# Patient Record
Sex: FEMALE | Race: WHITE | Employment: FULL TIME | URBAN - METROPOLITAN AREA
[De-identification: names, ages, dates, MRNs, and addresses within clinical notes are randomized per-mention and may not be internally consistent; named-entity substitution may affect disease eponyms.]

---

## 2017-05-04 ENCOUNTER — EMPLOYEE WELLNESS (OUTPATIENT)
Dept: OTHER | Age: 47
End: 2017-05-04

## 2017-05-04 LAB
CHOLESTEROL, TOTAL: 213 MG/DL (ref 0–199)
GLUCOSE BLD-MCNC: 78 MG/DL (ref 74–107)
HDLC SERPL-MCNC: 57 MG/DL
LDL CHOLESTEROL CALCULATED: 140 MG/DL (ref 0–99)
TRIGL SERPL-MCNC: 81 MG/DL (ref 0–149)

## 2018-03-20 VITALS — WEIGHT: 173 LBS | BODY MASS INDEX: 28.79 KG/M2

## 2018-04-21 ENCOUNTER — HOSPITAL ENCOUNTER (OUTPATIENT)
Age: 48
Discharge: HOME | End: 2018-04-21
Payer: COMMERCIAL

## 2018-04-21 LAB — FOLLICLE STIMULATING HORMONE: 29.3 MIU/ML

## 2018-04-21 PROCEDURE — 83001 ASSAY OF GONADOTROPIN (FSH): CPT

## 2018-04-21 PROCEDURE — 36415 COLL VENOUS BLD VENIPUNCTURE: CPT

## 2018-05-01 ENCOUNTER — EMPLOYEE WELLNESS (OUTPATIENT)
Dept: OTHER | Age: 48
End: 2018-05-01

## 2018-05-01 LAB
CHOLESTEROL, TOTAL: 244 MG/DL (ref 0–199)
GLUCOSE BLD-MCNC: 97 MG/DL (ref 74–107)
HDLC SERPL-MCNC: 54 MG/DL
LDL CHOLESTEROL CALCULATED: 163 MG/DL (ref 0–99)
TRIGL SERPL-MCNC: 136 MG/DL (ref 0–149)

## 2018-05-07 VITALS — BODY MASS INDEX: 30.62 KG/M2 | WEIGHT: 184 LBS

## 2018-05-29 ENCOUNTER — HOSPITAL ENCOUNTER (OUTPATIENT)
Dept: ULTRASOUND IMAGING | Age: 48
Discharge: HOME | End: 2018-05-31
Payer: COMMERCIAL

## 2018-05-29 DIAGNOSIS — N95.0 POST-MENOPAUSAL BLEEDING: ICD-10-CM

## 2018-05-29 PROCEDURE — 76856 US EXAM PELVIC COMPLETE: CPT

## 2018-05-29 PROCEDURE — 76830 TRANSVAGINAL US NON-OB: CPT

## 2018-08-07 ENCOUNTER — NURSE TRIAGE (OUTPATIENT)
Dept: OTHER | Facility: CLINIC | Age: 48
End: 2018-08-07

## 2019-02-08 ENCOUNTER — TELEPHONE (OUTPATIENT)
Dept: ORTHOPEDIC SURGERY | Age: 49
End: 2019-02-08

## 2019-02-08 DIAGNOSIS — M79.641 RIGHT HAND PAIN: Primary | ICD-10-CM

## 2019-02-11 ENCOUNTER — OFFICE VISIT (OUTPATIENT)
Dept: ORTHOPEDIC SURGERY | Age: 49
End: 2019-02-11
Payer: COMMERCIAL

## 2019-02-11 VITALS
TEMPERATURE: 98.1 F | DIASTOLIC BLOOD PRESSURE: 89 MMHG | HEART RATE: 79 BPM | SYSTOLIC BLOOD PRESSURE: 139 MMHG | RESPIRATION RATE: 16 BRPM

## 2019-02-11 DIAGNOSIS — M65.311 TRIGGER FINGER OF RIGHT THUMB: Primary | ICD-10-CM

## 2019-02-11 PROCEDURE — 20550 NJX 1 TENDON SHEATH/LIGAMENT: CPT | Performed by: ORTHOPAEDIC SURGERY

## 2019-02-11 PROCEDURE — 99203 OFFICE O/P NEW LOW 30 MIN: CPT | Performed by: ORTHOPAEDIC SURGERY

## 2019-02-11 RX ORDER — BETAMETHASONE SODIUM PHOSPHATE AND BETAMETHASONE ACETATE 3; 3 MG/ML; MG/ML
6 INJECTION, SUSPENSION INTRA-ARTICULAR; INTRALESIONAL; INTRAMUSCULAR; SOFT TISSUE ONCE
Status: COMPLETED | OUTPATIENT
Start: 2019-02-11 | End: 2019-02-11

## 2019-02-11 RX ADMIN — BETAMETHASONE SODIUM PHOSPHATE AND BETAMETHASONE ACETATE 6 MG: 3; 3 INJECTION, SUSPENSION INTRA-ARTICULAR; INTRALESIONAL; INTRAMUSCULAR; SOFT TISSUE at 12:28

## 2019-05-25 ENCOUNTER — EMPLOYEE WELLNESS (OUTPATIENT)
Dept: OTHER | Age: 49
End: 2019-05-25

## 2019-05-25 LAB
CHOLESTEROL, TOTAL: 281 MG/DL (ref 0–199)
GLUCOSE BLD-MCNC: 89 MG/DL (ref 74–107)
HDLC SERPL-MCNC: 47 MG/DL
LDL CHOLESTEROL CALCULATED: 203 MG/DL (ref 0–99)
TRIGL SERPL-MCNC: 157 MG/DL (ref 0–149)

## 2019-06-03 ENCOUNTER — HOSPITAL ENCOUNTER (OUTPATIENT)
Dept: ULTRASOUND IMAGING | Age: 49
Discharge: HOME | End: 2019-06-05
Payer: COMMERCIAL

## 2019-06-03 VITALS — BODY MASS INDEX: 30.79 KG/M2 | WEIGHT: 185 LBS

## 2019-06-03 DIAGNOSIS — D21.9 FIBROID: ICD-10-CM

## 2019-06-03 PROCEDURE — 76830 TRANSVAGINAL US NON-OB: CPT

## 2019-06-03 PROCEDURE — 76856 US EXAM PELVIC COMPLETE: CPT

## 2019-06-26 ENCOUNTER — HOSPITAL ENCOUNTER (OUTPATIENT)
Age: 49
Discharge: HOME | End: 2019-06-28
Payer: COMMERCIAL

## 2019-06-26 PROCEDURE — 88305 TISSUE EXAM BY PATHOLOGIST: CPT

## 2019-07-09 ENCOUNTER — HOSPITAL ENCOUNTER (OUTPATIENT)
Dept: GENERAL RADIOLOGY | Age: 49
Discharge: HOME | End: 2019-07-11
Payer: COMMERCIAL

## 2019-07-09 DIAGNOSIS — Z12.31 VISIT FOR SCREENING MAMMOGRAM: ICD-10-CM

## 2019-07-09 PROCEDURE — 77063 BREAST TOMOSYNTHESIS BI: CPT

## 2019-07-17 ENCOUNTER — OFFICE VISIT (OUTPATIENT)
Dept: PODIATRY | Age: 49
End: 2019-07-17
Payer: COMMERCIAL

## 2019-07-17 VITALS
SYSTOLIC BLOOD PRESSURE: 122 MMHG | TEMPERATURE: 97.4 F | WEIGHT: 178 LBS | BODY MASS INDEX: 29.62 KG/M2 | DIASTOLIC BLOOD PRESSURE: 82 MMHG

## 2019-07-17 DIAGNOSIS — M79.671 PAIN IN RIGHT FOOT: ICD-10-CM

## 2019-07-17 DIAGNOSIS — M72.2 PLANTAR FASCIITIS: Primary | ICD-10-CM

## 2019-07-17 DIAGNOSIS — M77.31 CALCANEAL SPUR OF FOOT, RIGHT: ICD-10-CM

## 2019-07-17 PROCEDURE — 29540 STRAPPING ANKLE &/FOOT: CPT | Performed by: PODIATRIST

## 2019-07-17 PROCEDURE — 20550 NJX 1 TENDON SHEATH/LIGAMENT: CPT | Performed by: PODIATRIST

## 2019-07-17 PROCEDURE — 99203 OFFICE O/P NEW LOW 30 MIN: CPT | Performed by: PODIATRIST

## 2019-07-17 RX ORDER — ETODOLAC 400 MG/1
400 TABLET, FILM COATED ORAL 2 TIMES DAILY
Qty: 180 TABLET | Refills: 0 | Status: SHIPPED
Start: 2019-07-17 | End: 2020-06-08 | Stop reason: ALTCHOICE

## 2019-07-17 RX ORDER — BETAMETHASONE SODIUM PHOSPHATE AND BETAMETHASONE ACETATE 3; 3 MG/ML; MG/ML
6 INJECTION, SUSPENSION INTRA-ARTICULAR; INTRALESIONAL; INTRAMUSCULAR; SOFT TISSUE ONCE
Status: COMPLETED | OUTPATIENT
Start: 2019-07-17 | End: 2019-07-17

## 2019-07-17 RX ORDER — BUPIVACAINE HYDROCHLORIDE 5 MG/ML
1 INJECTION, SOLUTION PERINEURAL ONCE
Status: COMPLETED | OUTPATIENT
Start: 2019-07-17 | End: 2019-07-17

## 2019-07-17 RX ORDER — CLONIDINE HYDROCHLORIDE 0.1 MG/1
0.1 TABLET ORAL DAILY
COMMUNITY
End: 2020-06-08 | Stop reason: ALTCHOICE

## 2019-07-17 RX ADMIN — BETAMETHASONE SODIUM PHOSPHATE AND BETAMETHASONE ACETATE 6 MG: 3; 3 INJECTION, SUSPENSION INTRA-ARTICULAR; INTRALESIONAL; INTRAMUSCULAR; SOFT TISSUE at 17:01

## 2019-07-17 RX ADMIN — BUPIVACAINE HYDROCHLORIDE 5 MG: 5 INJECTION, SOLUTION PERINEURAL at 17:01

## 2019-07-17 NOTE — PROGRESS NOTES
tobacco: Never Used    Tobacco comment: Pt states she smokes occasionally   Substance and Sexual Activity    Alcohol use: No     Alcohol/week: 0.0 standard drinks    Drug use: No    Sexual activity: Not on file   Lifestyle    Physical activity:     Days per week: Not on file     Minutes per session: Not on file    Stress: Not on file   Relationships    Social connections:     Talks on phone: Not on file     Gets together: Not on file     Attends Baptism service: Not on file     Active member of club or organization: Not on file     Attends meetings of clubs or organizations: Not on file     Relationship status: Not on file    Intimate partner violence:     Fear of current or ex partner: Not on file     Emotionally abused: Not on file     Physically abused: Not on file     Forced sexual activity: Not on file   Other Topics Concern    Not on file   Social History Narrative    Not on file       Vitals:    07/17/19 1607   BP: 122/82   Temp: 97.4 °F (36.3 °C)   TempSrc: Temporal   Weight: 178 lb (80.7 kg)       Focused Lower Extremity Physical Exam:  Vitals:    07/17/19 1607   BP: 122/82   Temp: 97.4 °F (36.3 °C)        Foot Exam    General  General Appearance: appears stated age and healthy   Orientation: alert and oriented to person, place, and time       Right Foot/Ankle     Inspection and Palpation  Tenderness: calcaneus tenderness, bony tenderness and plantar fascia              Ortho Exam    Vascular: pulses  dp  pt  palpable  Capillary Refill Time:   Hair growth  Skin:    Edema:    Neurologic:      Musculoskeletal/ Orthopedic examination:  Pain right heel inferior aspect pain with palpation weightbearing inferior aspect of right heel pain at the junction of the inferior plantar fascial muscle and the calcaneus  NAIL   Web space   Derm:      Xr Foot Right (min 3 Views)    Result Date: 7/17/2019  XRAY INTERPREATION Indication: pain Examination: 3 views foot Narrative:  There is a inferior and posterior calcaneal spur  Interpretation: Heel spur inferior posterior        . Assessment and Plan:  Melissa Mackenzie was seen today for established new doctor. Diagnoses and all orders for this visit:    Plantar fasciitis  -     XR FOOT RIGHT (MIN 3 VIEWS); Future    Calcaneal spur of foot, right    Pain in right foot    Other orders  -     etodolac (LODINE) 400 MG tablet; Take 1 tablet by mouth 2 times daily    Potential risks, complications, alternative treatments, and procedure prognosis were explained to the patient. Verbal informed consent was obtained from the patient. Betadine and alcohol preparation was performed over plantar fascia. 1 mL of 0.5% Marcaine plain and 1 cc of Celestone Soluspan 6 mg injected in standard medial approach plantar fascia. Patient tolerated conical steroid injection well. Band-Aid applied. The patient was educated on a possible steroid flare and the use of ice with frozen water bottle 10 minutes each night discussed. Continue passive and active range of motion stretches and strengthening bilateral plantar fascia and Achilles tendons. No follow-ups on file. STRAPPING AND TAPING  An application of 1 inch and 2 inch tape to plantar aspect of foot comprising of felt pad to support the foot and ankle to reduce pain and control swelling. Seen By:  Emir Blackman DPM      Document was created using voice recognition software. Note was reviewed, however may contain grammatical errors.

## 2019-08-05 ENCOUNTER — OFFICE VISIT (OUTPATIENT)
Dept: ORTHOPEDIC SURGERY | Age: 49
End: 2019-08-05
Payer: COMMERCIAL

## 2019-08-05 VITALS
BODY MASS INDEX: 28.56 KG/M2 | TEMPERATURE: 98.1 F | RESPIRATION RATE: 20 BRPM | WEIGHT: 182 LBS | SYSTOLIC BLOOD PRESSURE: 144 MMHG | DIASTOLIC BLOOD PRESSURE: 94 MMHG | HEART RATE: 88 BPM | HEIGHT: 67 IN

## 2019-08-05 DIAGNOSIS — M65.311 TRIGGER FINGER OF RIGHT THUMB: Primary | ICD-10-CM

## 2019-08-05 PROCEDURE — 20550 NJX 1 TENDON SHEATH/LIGAMENT: CPT | Performed by: ORTHOPAEDIC SURGERY

## 2019-08-05 PROCEDURE — 99213 OFFICE O/P EST LOW 20 MIN: CPT | Performed by: ORTHOPAEDIC SURGERY

## 2019-08-05 RX ORDER — BETAMETHASONE SODIUM PHOSPHATE AND BETAMETHASONE ACETATE 3; 3 MG/ML; MG/ML
6 INJECTION, SUSPENSION INTRA-ARTICULAR; INTRALESIONAL; INTRAMUSCULAR; SOFT TISSUE ONCE
Status: COMPLETED | OUTPATIENT
Start: 2019-08-05 | End: 2019-08-05

## 2019-08-05 RX ADMIN — BETAMETHASONE SODIUM PHOSPHATE AND BETAMETHASONE ACETATE 6 MG: 3; 3 INJECTION, SUSPENSION INTRA-ARTICULAR; INTRALESIONAL; INTRAMUSCULAR; SOFT TISSUE at 15:31

## 2019-09-10 ENCOUNTER — HOSPITAL ENCOUNTER (OUTPATIENT)
Dept: CT IMAGING | Age: 49
Discharge: HOME | End: 2019-09-12
Payer: COMMERCIAL

## 2019-09-10 DIAGNOSIS — K11.20 SIALOADENITIS: ICD-10-CM

## 2019-09-10 PROCEDURE — 70492 CT SFT TSUE NCK W/O & W/DYE: CPT

## 2019-09-10 PROCEDURE — 2580000003 HC RX 258: Performed by: RADIOLOGY

## 2019-09-10 PROCEDURE — 6360000004 HC RX CONTRAST MEDICATION: Performed by: RADIOLOGY

## 2019-09-10 RX ORDER — SODIUM CHLORIDE 0.9 % (FLUSH) 0.9 %
10 SYRINGE (ML) INJECTION PRN
Status: COMPLETED | OUTPATIENT
Start: 2019-09-10 | End: 2019-09-10

## 2019-09-10 RX ADMIN — Medication 10 ML: at 15:43

## 2019-09-10 RX ADMIN — IOPAMIDOL 80 ML: 755 INJECTION, SOLUTION INTRAVENOUS at 15:43

## 2019-10-19 ENCOUNTER — HOSPITAL ENCOUNTER (OUTPATIENT)
Age: 49
Discharge: HOME | End: 2019-10-19
Payer: COMMERCIAL

## 2019-10-19 LAB
HCT VFR BLD CALC: 44.4 % (ref 34–48)
HEMOGLOBIN: 15.2 G/DL (ref 11.5–15.5)
MCH RBC QN AUTO: 30 PG (ref 26–35)
MCHC RBC AUTO-ENTMCNC: 34.2 % (ref 32–34.5)
MCV RBC AUTO: 87.6 FL (ref 80–99.9)
PDW BLD-RTO: 11.9 FL (ref 11.5–15)
PLATELET # BLD: 325 E9/L (ref 130–450)
PMV BLD AUTO: 11.5 FL (ref 7–12)
RBC # BLD: 5.07 E12/L (ref 3.5–5.5)
TSH SERPL DL<=0.05 MIU/L-ACNC: 2.12 UIU/ML (ref 0.27–4.2)
WBC # BLD: 9.1 E9/L (ref 4.5–11.5)

## 2019-10-19 PROCEDURE — 84443 ASSAY THYROID STIM HORMONE: CPT

## 2019-10-19 PROCEDURE — 36415 COLL VENOUS BLD VENIPUNCTURE: CPT

## 2019-10-19 PROCEDURE — 85027 COMPLETE CBC AUTOMATED: CPT

## 2020-02-21 ENCOUNTER — TELEPHONE (OUTPATIENT)
Dept: ORTHOPEDIC SURGERY | Age: 50
End: 2020-02-21

## 2020-02-24 ENCOUNTER — OFFICE VISIT (OUTPATIENT)
Dept: ORTHOPEDIC SURGERY | Age: 50
End: 2020-02-24
Payer: COMMERCIAL

## 2020-02-24 VITALS — DIASTOLIC BLOOD PRESSURE: 87 MMHG | SYSTOLIC BLOOD PRESSURE: 127 MMHG | HEART RATE: 89 BPM | RESPIRATION RATE: 18 BRPM

## 2020-02-24 PROCEDURE — 99214 OFFICE O/P EST MOD 30 MIN: CPT | Performed by: ORTHOPAEDIC SURGERY

## 2020-02-24 PROCEDURE — 20550 NJX 1 TENDON SHEATH/LIGAMENT: CPT | Performed by: ORTHOPAEDIC SURGERY

## 2020-02-24 RX ORDER — ATORVASTATIN CALCIUM 10 MG/1
10 TABLET, FILM COATED ORAL DAILY
COMMUNITY
Start: 2020-02-06 | End: 2021-08-05 | Stop reason: SDUPTHER

## 2020-02-24 RX ORDER — LOSARTAN POTASSIUM 25 MG/1
25 TABLET ORAL DAILY
COMMUNITY
Start: 2020-02-06 | End: 2020-12-11

## 2020-02-24 RX ORDER — BETAMETHASONE SODIUM PHOSPHATE AND BETAMETHASONE ACETATE 3; 3 MG/ML; MG/ML
6 INJECTION, SUSPENSION INTRA-ARTICULAR; INTRALESIONAL; INTRAMUSCULAR; SOFT TISSUE ONCE
Status: COMPLETED | OUTPATIENT
Start: 2020-02-24 | End: 2020-02-24

## 2020-02-24 RX ADMIN — BETAMETHASONE SODIUM PHOSPHATE AND BETAMETHASONE ACETATE 6 MG: 3; 3 INJECTION, SUSPENSION INTRA-ARTICULAR; INTRALESIONAL; INTRAMUSCULAR; SOFT TISSUE at 15:59

## 2020-02-24 NOTE — PROGRESS NOTES
Department of Orthopedic Surgery  [facility] Cornell See MD  History and Physical      CHIEF COMPLAINT:  Right thumb pain     HISTORY OF PRESENT ILLNESS:                The patient is a 44 y.o. female who presents with right thumb pain that has been ongoing for the past 1 year. She states that she had an injection for a trigger thumb on 2/11/2019 that she got 7 months of relief from. Her last injection she got 4 months relief. She does get numbness and tingling into her hand as well that wakes her up at night. She is right hand dominant. 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 Past Medical History:        Diagnosis Date    Hyperlipidemia     Hypertension      Past Surgical History:    No past surgical history on file. Current Medications:   No current facility-administered medications for this visit. Allergies:  Patient has no known allergies. Social History:   TOBACCO:   reports that she has been smoking cigarettes. She has been smoking about 0.25 packs per day. She has never used smokeless tobacco.  ETOH:   reports no history of alcohol use. DRUGS:   reports no history of drug use.   ACTIVITIES OF DAILY LIVING:    OCCUPATION:    Family History:       Problem Relation Age of Onset    Heart Surgery Mother     Heart Attack Mother     Diabetes Mother        REVIEW OF SYSTEMS:  CONSTITUTIONAL:  negative  EYES:  negative  HEENT:  negative  RESPIRATORY:  negative  CARDIOVASCULAR:  negative  MUSCULOSKELETAL:  positive for  Pain right thumb  NEUROLOGICAL:  positive for numbness and tingling    PHYSICAL EXAM:    VITALS:  /87 (Site: Left Upper Arm, Position: Sitting, Cuff Size: Medium Adult)   Pulse 89   Resp 18   CONSTITUTIONAL:  awake, alert, cooperative, no apparent distress, and appears stated age  EYES:  Lids and lashes normal, pupils equal, round and reactive to light, extra ocular muscles intact, sclera clear, conjunctiva normal  ENT:  normocepalic, without obvious abnormality  NECK:  supple, symmetrical, trachea midline  LUNGS:  no increased work of breathing  CARDIOVASCULAR:  no edema  NEUROLOGIC:  Awake, alert, oriented to name, place and time. Cranial nerves II-XII are grossly intact. Motor is 5 out of 5 bilaterally. gait is normal.  MUSCULOSKELETAL:  Right hand: - cmc grind. Mildly positive Finkelstein, + tenderness over A1 pulley of the thumb. + Brit, + Tinel at the wrist, - tinel at the elbow. SILT median, ulnar, radial nerve distribution. No pain over any other A1 pulley. +2/4 radial pulse. Full digital ROM. Full wrist ROM. DATA:    CBC:   Lab Results   Component Value Date    WBC 9.1 10/19/2019    RBC 5.07 10/19/2019    HGB 15.2 10/19/2019    HCT 44.4 10/19/2019    MCV 87.6 10/19/2019    MCH 30.0 10/19/2019    MCHC 34.2 10/19/2019    RDW 11.9 10/19/2019     10/19/2019    MPV 11.5 10/19/2019     PT/INR:  No results found for: PROTIME, INR    Radiology Review:  Xray right 3 views  hand obtained and reviewed in office today demonstrating stage I Cmc arthritis no fractures or dislocations. Impression: No fractures or dislocations. IMPRESSION:  · Right carpal tunnel syndrome  · Right trigger thumb  · Right thumb ganglion of flexor sheath    PLAN:  Patient would like to proceed with trigger thumb release and endoscopic carpal tunnel release. She wants to have surgery in may and would like to have an injection for the trigger thumb today to hold her over. Procedure Note Trigger Finger Injection    The right Thumb over the A1 pulley was identified as the injection site. The risk and benefits of a cortisone injection were explained and the patient consented to the injection. Under sterile conditions, the digit was injected with a mixture of 1 mL of 1% Lidocaine and 1 mL of 6 mg/mL Betamethasone without complication. A sterile bandage was applied. I have seen and evaluated the patient and agree with the above assessment and plan on today's visit.  I have performed the rahman components of the history and physical examination with significant findings of current right thumb triggering with ganglion of flexor sheath as well as carpal tunnel syndrome. Injection repeated over the thumb A1 pulley. Patient was also scheduled for right thumb trigger release with ganglion cyst excision and right endoscopic carpal tunnel release. I concur with the findings and plan as documented. I explained the risks, benefits, alternatives and complications of surgery with the patient including but not limited to the risks of infection, possible damage to nerves, vessels, or tendons, stiffness, loss of range of motion, scar sensitivity, wound healing complications, worsening symptoms, possible need for therapy, as well as the possible need further surgery and unanticipated complications. The patient voiced understanding and all questions were answered. The patient elected to proceed with surgical intervention.      Char Grimes  2/24/2020

## 2020-06-04 ENCOUNTER — PREP FOR PROCEDURE (OUTPATIENT)
Dept: ORTHOPEDIC SURGERY | Age: 50
End: 2020-06-04

## 2020-06-04 RX ORDER — SODIUM CHLORIDE 0.9 % (FLUSH) 0.9 %
10 SYRINGE (ML) INJECTION PRN
Status: CANCELLED | OUTPATIENT
Start: 2020-06-04

## 2020-06-04 RX ORDER — SODIUM CHLORIDE 0.9 % (FLUSH) 0.9 %
10 SYRINGE (ML) INJECTION EVERY 12 HOURS SCHEDULED
Status: CANCELLED | OUTPATIENT
Start: 2020-06-04

## 2020-06-04 RX ORDER — SODIUM CHLORIDE 9 MG/ML
INJECTION, SOLUTION INTRAVENOUS CONTINUOUS
Status: CANCELLED | OUTPATIENT
Start: 2020-06-04

## 2020-06-05 ENCOUNTER — HOSPITAL ENCOUNTER (OUTPATIENT)
Age: 50
Discharge: HOME | End: 2020-06-07
Payer: COMMERCIAL

## 2020-06-05 PROCEDURE — U0003 INFECTIOUS AGENT DETECTION BY NUCLEIC ACID (DNA OR RNA); SEVERE ACUTE RESPIRATORY SYNDROME CORONAVIRUS 2 (SARS-COV-2) (CORONAVIRUS DISEASE [COVID-19]), AMPLIFIED PROBE TECHNIQUE, MAKING USE OF HIGH THROUGHPUT TECHNOLOGIES AS DESCRIBED BY CMS-2020-01-R: HCPCS

## 2020-06-07 LAB
SARS-COV-2: NOT DETECTED
SOURCE: NORMAL

## 2020-06-08 RX ORDER — IBUPROFEN 400 MG/1
400 TABLET ORAL EVERY 6 HOURS PRN
COMMUNITY
End: 2021-05-05 | Stop reason: CLARIF

## 2020-06-08 RX ORDER — MULTIVIT-MIN/IRON/FOLIC ACID/K 18-600-40
CAPSULE ORAL NIGHTLY
COMMUNITY
End: 2021-06-08

## 2020-06-10 ENCOUNTER — HOSPITAL ENCOUNTER (OUTPATIENT)
Age: 50
Setting detail: OUTPATIENT SURGERY
Discharge: HOME | End: 2020-06-10
Attending: ORTHOPAEDIC SURGERY | Admitting: ORTHOPAEDIC SURGERY
Payer: COMMERCIAL

## 2020-06-10 ENCOUNTER — ANESTHESIA EVENT (OUTPATIENT)
Dept: OPERATING ROOM | Age: 50
End: 2020-06-10
Payer: COMMERCIAL

## 2020-06-10 ENCOUNTER — ANESTHESIA (OUTPATIENT)
Dept: OPERATING ROOM | Age: 50
End: 2020-06-10
Payer: COMMERCIAL

## 2020-06-10 VITALS
TEMPERATURE: 97.1 F | HEIGHT: 66 IN | BODY MASS INDEX: 29.57 KG/M2 | OXYGEN SATURATION: 97 % | HEART RATE: 70 BPM | WEIGHT: 184 LBS | DIASTOLIC BLOOD PRESSURE: 80 MMHG | RESPIRATION RATE: 18 BRPM | SYSTOLIC BLOOD PRESSURE: 115 MMHG

## 2020-06-10 VITALS — SYSTOLIC BLOOD PRESSURE: 96 MMHG | OXYGEN SATURATION: 97 % | DIASTOLIC BLOOD PRESSURE: 57 MMHG

## 2020-06-10 PROCEDURE — 6360000002 HC RX W HCPCS: Performed by: PHYSICIAN ASSISTANT

## 2020-06-10 PROCEDURE — 2500000003 HC RX 250 WO HCPCS: Performed by: ORTHOPAEDIC SURGERY

## 2020-06-10 PROCEDURE — 7100000010 HC PHASE II RECOVERY - FIRST 15 MIN: Performed by: ORTHOPAEDIC SURGERY

## 2020-06-10 PROCEDURE — 2720000010 HC SURG SUPPLY STERILE: Performed by: ORTHOPAEDIC SURGERY

## 2020-06-10 PROCEDURE — 3600000013 HC SURGERY LEVEL 3 ADDTL 15MIN: Performed by: ORTHOPAEDIC SURGERY

## 2020-06-10 PROCEDURE — 3700000001 HC ADD 15 MINUTES (ANESTHESIA): Performed by: ORTHOPAEDIC SURGERY

## 2020-06-10 PROCEDURE — 2709999900 HC NON-CHARGEABLE SUPPLY: Performed by: ORTHOPAEDIC SURGERY

## 2020-06-10 PROCEDURE — 26055 INCISE FINGER TENDON SHEATH: CPT | Performed by: ORTHOPAEDIC SURGERY

## 2020-06-10 PROCEDURE — 2580000003 HC RX 258: Performed by: PHYSICIAN ASSISTANT

## 2020-06-10 PROCEDURE — 7100000011 HC PHASE II RECOVERY - ADDTL 15 MIN: Performed by: ORTHOPAEDIC SURGERY

## 2020-06-10 PROCEDURE — 6360000002 HC RX W HCPCS: Performed by: NURSE ANESTHETIST, CERTIFIED REGISTERED

## 2020-06-10 PROCEDURE — 3700000000 HC ANESTHESIA ATTENDED CARE: Performed by: ORTHOPAEDIC SURGERY

## 2020-06-10 PROCEDURE — 3600000003 HC SURGERY LEVEL 3 BASE: Performed by: ORTHOPAEDIC SURGERY

## 2020-06-10 PROCEDURE — 29848 WRIST ENDOSCOPY/SURGERY: CPT | Performed by: ORTHOPAEDIC SURGERY

## 2020-06-10 RX ORDER — FENTANYL CITRATE 50 UG/ML
INJECTION, SOLUTION INTRAMUSCULAR; INTRAVENOUS PRN
Status: DISCONTINUED | OUTPATIENT
Start: 2020-06-10 | End: 2020-06-10 | Stop reason: SDUPTHER

## 2020-06-10 RX ORDER — HYDROCODONE BITARTRATE AND ACETAMINOPHEN 5; 325 MG/1; MG/1
1 TABLET ORAL
Status: DISCONTINUED | OUTPATIENT
Start: 2020-06-10 | End: 2020-06-10 | Stop reason: HOSPADM

## 2020-06-10 RX ORDER — DIPHENHYDRAMINE HYDROCHLORIDE 50 MG/ML
12.5 INJECTION INTRAMUSCULAR; INTRAVENOUS
Status: DISCONTINUED | OUTPATIENT
Start: 2020-06-10 | End: 2020-06-10 | Stop reason: HOSPADM

## 2020-06-10 RX ORDER — MEPERIDINE HYDROCHLORIDE 25 MG/ML
12.5 INJECTION INTRAMUSCULAR; INTRAVENOUS; SUBCUTANEOUS EVERY 10 MIN PRN
Status: DISCONTINUED | OUTPATIENT
Start: 2020-06-10 | End: 2020-06-10 | Stop reason: HOSPADM

## 2020-06-10 RX ORDER — SODIUM CHLORIDE 0.9 % (FLUSH) 0.9 %
10 SYRINGE (ML) INJECTION EVERY 12 HOURS SCHEDULED
Status: DISCONTINUED | OUTPATIENT
Start: 2020-06-10 | End: 2020-06-10 | Stop reason: HOSPADM

## 2020-06-10 RX ORDER — SODIUM CHLORIDE 0.9 % (FLUSH) 0.9 %
10 SYRINGE (ML) INJECTION PRN
Status: DISCONTINUED | OUTPATIENT
Start: 2020-06-10 | End: 2020-06-10 | Stop reason: HOSPADM

## 2020-06-10 RX ORDER — PROMETHAZINE HYDROCHLORIDE 25 MG/ML
6.25 INJECTION, SOLUTION INTRAMUSCULAR; INTRAVENOUS
Status: DISCONTINUED | OUTPATIENT
Start: 2020-06-10 | End: 2020-06-10 | Stop reason: HOSPADM

## 2020-06-10 RX ORDER — FENTANYL CITRATE 50 UG/ML
50 INJECTION, SOLUTION INTRAMUSCULAR; INTRAVENOUS EVERY 5 MIN PRN
Status: DISCONTINUED | OUTPATIENT
Start: 2020-06-10 | End: 2020-06-10 | Stop reason: HOSPADM

## 2020-06-10 RX ORDER — SODIUM CHLORIDE 9 MG/ML
INJECTION, SOLUTION INTRAVENOUS CONTINUOUS
Status: DISCONTINUED | OUTPATIENT
Start: 2020-06-10 | End: 2020-06-10 | Stop reason: HOSPADM

## 2020-06-10 RX ORDER — MIDAZOLAM HYDROCHLORIDE 1 MG/ML
INJECTION INTRAMUSCULAR; INTRAVENOUS PRN
Status: DISCONTINUED | OUTPATIENT
Start: 2020-06-10 | End: 2020-06-10 | Stop reason: SDUPTHER

## 2020-06-10 RX ORDER — CEFAZOLIN SODIUM 2 G/50ML
2 SOLUTION INTRAVENOUS
Status: DISCONTINUED | OUTPATIENT
Start: 2020-06-10 | End: 2020-06-10 | Stop reason: CLARIF

## 2020-06-10 RX ORDER — HYDROCODONE BITARTRATE AND ACETAMINOPHEN 5; 325 MG/1; MG/1
1 TABLET ORAL EVERY 6 HOURS PRN
Qty: 12 TABLET | Refills: 0 | Status: SHIPPED | OUTPATIENT
Start: 2020-06-10 | End: 2020-06-17

## 2020-06-10 RX ORDER — PROPOFOL 10 MG/ML
INJECTION, EMULSION INTRAVENOUS CONTINUOUS PRN
Status: DISCONTINUED | OUTPATIENT
Start: 2020-06-10 | End: 2020-06-10 | Stop reason: SDUPTHER

## 2020-06-10 RX ORDER — LIDOCAINE HYDROCHLORIDE AND EPINEPHRINE 10; 10 MG/ML; UG/ML
INJECTION, SOLUTION INFILTRATION; PERINEURAL PRN
Status: DISCONTINUED | OUTPATIENT
Start: 2020-06-10 | End: 2020-06-10 | Stop reason: ALTCHOICE

## 2020-06-10 RX ADMIN — SODIUM CHLORIDE: 9 INJECTION, SOLUTION INTRAVENOUS at 09:06

## 2020-06-10 RX ADMIN — FENTANYL CITRATE 50 MCG: 50 INJECTION, SOLUTION INTRAMUSCULAR; INTRAVENOUS at 09:16

## 2020-06-10 RX ADMIN — Medication 2 G: at 09:06

## 2020-06-10 RX ADMIN — PROPOFOL 125 MCG/KG/MIN: 10 INJECTION, EMULSION INTRAVENOUS at 09:16

## 2020-06-10 RX ADMIN — FENTANYL CITRATE 50 MCG: 50 INJECTION, SOLUTION INTRAMUSCULAR; INTRAVENOUS at 09:20

## 2020-06-10 RX ADMIN — MIDAZOLAM 2 MG: 1 INJECTION INTRAMUSCULAR; INTRAVENOUS at 09:09

## 2020-06-10 ASSESSMENT — PAIN SCALES - GENERAL
PAINLEVEL_OUTOF10: 0

## 2020-06-10 ASSESSMENT — PULMONARY FUNCTION TESTS
PIF_VALUE: 0
PIF_VALUE: 1
PIF_VALUE: 0
PIF_VALUE: 0
PIF_VALUE: 1
PIF_VALUE: 1
PIF_VALUE: 0
PIF_VALUE: 1
PIF_VALUE: 0
PIF_VALUE: 1

## 2020-06-10 ASSESSMENT — PAIN DESCRIPTION - LOCATION: LOCATION: HAND

## 2020-06-10 ASSESSMENT — LIFESTYLE VARIABLES: SMOKING_STATUS: 1

## 2020-06-10 ASSESSMENT — PAIN DESCRIPTION - PAIN TYPE
TYPE: SURGICAL PAIN

## 2020-06-10 ASSESSMENT — PAIN DESCRIPTION - ORIENTATION: ORIENTATION: RIGHT

## 2020-06-10 ASSESSMENT — PAIN - FUNCTIONAL ASSESSMENT: PAIN_FUNCTIONAL_ASSESSMENT: 0-10

## 2020-06-10 NOTE — H&P
extra ocular muscles intact, sclera clear, conjunctiva normal  ENT:  normocepalic, without obvious abnormality  NECK:  supple, symmetrical, trachea midline  LUNGS:  no increased work of breathing  CARDIOVASCULAR:  no edema  NEUROLOGIC:  Awake, alert, oriented to name, place and time. Cranial nerves II-XII are grossly intact. Motor is 5 out of 5 bilaterally. gait is normal.  MUSCULOSKELETAL:  Right hand: - cmc grind. Mildly positive Finkelstein, + tenderness over A1 pulley of the thumb. + Brit, + Tinel at the wrist, - tinel at the elbow. SILT median, ulnar, radial nerve distribution. No pain over any other A1 pulley. +2/4 radial pulse. Full digital ROM. Full wrist ROM.    DATA:    CBC:         Lab Results   Component Value Date     WBC 9.1 10/19/2019     RBC 5.07 10/19/2019     HGB 15.2 10/19/2019     HCT 44.4 10/19/2019     MCV 87.6 10/19/2019     MCH 30.0 10/19/2019     MCHC 34.2 10/19/2019     RDW 11.9 10/19/2019      10/19/2019     MPV 11.5 10/19/2019      PT/INR:  No results found for: PROTIME, INR     Radiology Review:  Xray right 3 views  hand obtained and reviewed in office today demonstrating stage I Cmc arthritis no fractures or dislocations. Impression: No fractures or dislocations.      IMPRESSION:  · Right carpal tunnel syndrome  · Right trigger thumb  · Right thumb ganglion of flexor sheath     PLAN:  Patient would like to proceed with trigger thumb release and endoscopic carpal tunnel release.      I have seen and evaluated the patient and agree with the above assessment and plan on today's visit. I have performed the key components of the history and physical examination with significant findings of current right thumb triggering with ganglion of flexor sheath as well as carpal tunnel syndrome. Injection repeated over the thumb A1 pulley. Patient was also scheduled for right thumb trigger release with ganglion cyst excision and right endoscopic carpal tunnel release.  I concur with

## 2020-06-10 NOTE — OP NOTE
underlying tendons, there was full and unhindered flexion of the interphalangeal joint. The tourniquet was then defleated. Hemostasis was achieved with bipolar cautery and the wound closed with 4-0 Nylon suture. A bulky sterile dressing was applied. The hand and all digits were pink and warm at the conclusion of the case. The patient tolerated the procedure and was taken to the recovery room in stable condition.           Electronically signed by Lauren Aguilar MD on 6/10/2020 at 9:34 AM

## 2020-06-10 NOTE — ANESTHESIA PRE PROCEDURE
Department of Anesthesiology  Preprocedure Note       Name:  Roc Marroquin   Age:  44 y.o.  :  1970                                          MRN:  [id number]         Date:  6/10/2020      Surgeon: Robert Vallecillo):  [place], MD    Procedure: Procedure(s):  RIGHT THUMB FINGER TRIGGER RELEASE  RIGHT GANGLION CYST EXCISION  RIGHT CARPAL TUNNEL RELEASE ENDOSCOPIC    Medications prior to admission:   Prior to Admission medications    Medication Sig Start Date End Date Taking? Authorizing Provider   HYDROcodone-acetaminophen (NORCO) 5-325 MG per tablet Take 1 tablet by mouth every 6 hours as needed for Pain for up to 7 days.  6/10/20 6/17/20 Yes TRUDI Aiken   Cholecalciferol (VITAMIN D) 50 MCG ( UT) CAPS capsule Take by mouth nightly LD 20   Yes Historical Provider, MD   ibuprofen (ADVIL;MOTRIN) 400 MG tablet Take 400 mg by mouth every 6 hours as needed for Pain LD 20   Yes Historical Provider, MD   atorvastatin (LIPITOR) 10 MG tablet Take 10 mg by mouth daily  20  Yes Historical Provider, MD   losartan (COZAAR) 25 MG tablet Take 25 mg by mouth daily  20  Yes Historical Provider, MD       Current medications:    Current Facility-Administered Medications   Medication Dose Route Frequency Provider Last Rate Last Dose    0.9 % sodium chloride infusion   Intravenous Continuous TRUDI Aiken        sodium chloride flush 0.9 % injection 10 mL  10 mL Intravenous 2 times per day TRUDI Aiken        sodium chloride flush 0.9 % injection 10 mL  10 mL Intravenous PRN TRUDI Aiken        ceFAZolin (ANCEF) 2 g in sterile water 20 mL IV syringe  2 g Intravenous On Call to [address] TRUDI [address]           Allergies:  No Known Allergies    Problem List:    Patient Active Problem List   Diagnosis Code    Chest pain R07.9    Essential hypertension I10    Dyslipidemia E78.5    Non morbid obesity due to excess calories E66.09    Vitamin D deficiency E55.9    Plantar fasciitis 06/03/2016    CALCIUM 9.7 06/03/2016    BILITOT 0.2 06/03/2016    ALKPHOS 53 06/03/2016    AST 18 06/03/2016    ALT 19 06/03/2016       POC Tests: No results for input(s): POCGLU, POCNA, POCK, POCCL, POCBUN, POCHEMO, POCHCT in the last 72 hours. Coags: No results found for: PROTIME, INR, APTT    HCG (If Applicable): No results found for: PREGTESTUR, PREGSERUM, HCG, HCGQUANT     ABGs: No results found for: PHART, PO2ART, OOH9OSY, YXF9XGN, BEART, X9JSMXZN     Type & Screen (If Applicable):  No results found for: LABABO, LABRH    Drug/Infectious Status (If Applicable):  No results found for: HIV, HEPCAB    COVID-19 Screening (If Applicable):   Lab Results   Component Value Date    COVID19 Not Detected 06/05/2020         Anesthesia Evaluation  Patient summary reviewed and Nursing notes reviewed no history of anesthetic complications:   Airway: Mallampati: II  TM distance: >3 FB   Neck ROM: full  Mouth opening: > = 3 FB Dental: normal exam         Pulmonary: breath sounds clear to auscultation  (+) current smoker                           Cardiovascular:  Exercise tolerance: good (>4 METS),   (+) hypertension:, hyperlipidemia        Rhythm: regular  Rate: normal           Beta Blocker:  Dose within 24 Hrs         Neuro/Psych:   Negative Neuro/Psych ROS              GI/Hepatic/Renal: Neg GI/Hepatic/Renal ROS            Endo/Other: Negative Endo/Other ROS                    Abdominal:           Vascular: negative vascular ROS. Anesthesia Plan      MAC     ASA 3       Induction: intravenous. Anesthetic plan and risks discussed with patient and spouse.                     Lisa Lee MD   6/10/2020

## 2020-06-18 ENCOUNTER — OFFICE VISIT (OUTPATIENT)
Dept: ORTHOPEDIC SURGERY | Age: 50
End: 2020-06-18

## 2020-06-18 VITALS — RESPIRATION RATE: 20 BRPM | BODY MASS INDEX: 28.56 KG/M2 | WEIGHT: 182 LBS | HEIGHT: 67 IN | TEMPERATURE: 96.7 F

## 2020-06-18 PROCEDURE — 99024 POSTOP FOLLOW-UP VISIT: CPT | Performed by: ORTHOPAEDIC SURGERY

## 2020-06-18 RX ORDER — METHYLPREDNISOLONE 4 MG/1
TABLET ORAL
Qty: 1 KIT | Refills: 0 | Status: SHIPPED | OUTPATIENT
Start: 2020-06-18 | End: 2020-06-24

## 2020-06-23 ENCOUNTER — HOSPITAL ENCOUNTER (OUTPATIENT)
Dept: OCCUPATIONAL THERAPY | Age: 50
Setting detail: THERAPIES SERIES
Discharge: HOME | End: 2020-06-23
Payer: COMMERCIAL

## 2020-06-23 PROCEDURE — 97165 OT EVAL LOW COMPLEX 30 MIN: CPT | Performed by: OCCUPATIONAL THERAPIST

## 2020-06-23 PROCEDURE — 97140 MANUAL THERAPY 1/> REGIONS: CPT | Performed by: OCCUPATIONAL THERAPIST

## 2020-06-23 PROCEDURE — 97110 THERAPEUTIC EXERCISES: CPT | Performed by: OCCUPATIONAL THERAPIST

## 2020-06-23 NOTE — PROGRESS NOTES
OCCUPATIONAL THERAPY INITIAL EVALUATION    [facility]  [facility] [place]  [address] [address]  Dept: [address]: [phone]   SEAT Fax: [phone]    Date:  2020  Initial Evaluation Date: 20   Evaluating Therapist: Jovan Styles    Patient Name:  Sharmila Cooper    :  1970    Restrictions/Precautions:  Per protocol, low fall risk  Diagnosis:  CTR, trigger finger release R thumb       Insurance/Certification information:  Medical [place]  Referring Practitioner:  DR Dmitri Nolan  Referring Practitioner specific orders: rom, progress as ivan  Date of Surgery/Injury: , 13 days post op  Plan of care signed (Y/N):  no  Visit# / total visits:     Past Medical History:   Past Medical History:   Diagnosis Date    Hyperlipidemia     Hypertension     Seasonal allergies     Trigger thumb of right hand     for OR 6-10-20      Past Surgical History:   Past Surgical History:   Procedure Laterality Date    CARPAL TUNNEL RELEASE Right 6/10/2020    RIGHT CARPAL TUNNEL RELEASE ENDOSCOPIC performed by Chris Manjarrez MD at [facility] [address] Right 6/10/2020    RIGHT THUMB FINGER TRIGGER RELEASE performed by Chris Manjarrez MD at [address] [facility]         Reason for Referral: Pt. Had carpal tunnel and trigger thumb for the past year and underwent surgery for release. Pt. Reports some pain, stiffness, decreased functional use of the right hand.      Home Living: lives with  and two older children  Prior Level of Function: independent    Cognition:   Alert/Oriented x3     IADL STATUS:      Ind  Mod I  Min A  Mod A  Max A  Dep  N/A    Homemaking Responsibility:  []   [x]   []   []   []   []  []  Shopping Responsibility:  []   [x]   []   []   []   []  []  Mode of Transportation:  [x]   []   []   []   []   []  []  Leisure & Hobbies:   []   []   []   [x]   []   []  []  Work:     []

## 2020-06-25 ENCOUNTER — HOSPITAL ENCOUNTER (OUTPATIENT)
Dept: OCCUPATIONAL THERAPY | Age: 50
Setting detail: THERAPIES SERIES
Discharge: HOME | End: 2020-06-25
Payer: COMMERCIAL

## 2020-06-25 PROCEDURE — 97022 WHIRLPOOL THERAPY: CPT | Performed by: OCCUPATIONAL THERAPIST

## 2020-06-25 PROCEDURE — 97110 THERAPEUTIC EXERCISES: CPT | Performed by: OCCUPATIONAL THERAPIST

## 2020-06-25 PROCEDURE — 97140 MANUAL THERAPY 1/> REGIONS: CPT | Performed by: OCCUPATIONAL THERAPIST

## 2020-06-30 ENCOUNTER — HOSPITAL ENCOUNTER (OUTPATIENT)
Dept: OCCUPATIONAL THERAPY | Age: 50
Setting detail: THERAPIES SERIES
Discharge: HOME | End: 2020-06-30
Payer: COMMERCIAL

## 2020-06-30 PROCEDURE — 97022 WHIRLPOOL THERAPY: CPT | Performed by: OCCUPATIONAL THERAPIST

## 2020-06-30 PROCEDURE — 97110 THERAPEUTIC EXERCISES: CPT | Performed by: OCCUPATIONAL THERAPIST

## 2020-06-30 PROCEDURE — 97140 MANUAL THERAPY 1/> REGIONS: CPT | Performed by: OCCUPATIONAL THERAPIST

## 2020-06-30 NOTE — PROGRESS NOTES
OCCUPATIONAL THERAPY PROGRESS NOTE    Date:  2020      Initial Evaluation Date: 20                                Evaluating Therapist: Braydon Baez     Patient Name:  Ceferino Conway                         :  1970     Restrictions/Precautions:  Per protocol, low fall risk  Diagnosis:  CTR, trigger finger release R thumb                                                        Insurance/Certification information:  Medical [place]  Referring Practitioner:  DR Michael Moulton  Referring Practitioner specific orders: rom, progress as ivan  Date of Surgery/Injury: , 13 days post op  Plan of care signed (Y/N):  no  Visit# / total visits: 3 / 18     Pain Level: 3 on scale of 1-10, aching    Subjective: \"Pt. Reports min numbness and thumb is still sore but improving. \"     Objective:  Updated POC to be completed by 20  INTERVENTION: COMPLETED: SPECIFICS/COMMENTS:   Modality:     fluido x 10 min        AROM:     Small lpeg activity x 10 min   Towel thumb ex's x 15x3   Nerve glides x 5 reps   Prayer stretches and wrist flex stretches x 10x   Tendon glides x 10x2   Thumb all planes x 2 sets   Taryn activity x 10 ,  min   AAROM:               PROM/Stretching:     Thumb wrist as ivan all planes x 15x2        Scar Mass/Edema Control:     Soft tissue incisions x 10 min        Strengthening:               Other:                 Assessment/Comments: Pt is making Good progress toward stated plan of care. Softening of scar tissue noted with soft tissue mob. Good ivan with exercises. Improving thumb and wrist mobiltity is noted.  Will progress as ivan.     -Rehab Potential: Good    -Requires OT Follow Up: Yes  Time In:1          Time Out: 2               Treatment Charges: Mins Units   Modalities 15 1   Ther Exercise 30 2   Manual Therapy 15 1   Thera Activities     ADL/Home Mgt      Neuro Re-education     Gait Training     Group Therapy     Non-Billable Service Time     Other     Total Time/Units 60 4

## 2020-07-02 ENCOUNTER — HOSPITAL ENCOUNTER (OUTPATIENT)
Dept: OCCUPATIONAL THERAPY | Age: 50
Setting detail: THERAPIES SERIES
Discharge: HOME | End: 2020-07-02
Payer: COMMERCIAL

## 2020-07-02 PROCEDURE — 97530 THERAPEUTIC ACTIVITIES: CPT

## 2020-07-02 PROCEDURE — 97110 THERAPEUTIC EXERCISES: CPT

## 2020-07-02 PROCEDURE — 97140 MANUAL THERAPY 1/> REGIONS: CPT

## 2020-07-02 PROCEDURE — 97022 WHIRLPOOL THERAPY: CPT

## 2020-07-07 ENCOUNTER — HOSPITAL ENCOUNTER (OUTPATIENT)
Dept: OCCUPATIONAL THERAPY | Age: 50
Setting detail: THERAPIES SERIES
Discharge: HOME | End: 2020-07-07
Payer: COMMERCIAL

## 2020-07-07 PROCEDURE — 97110 THERAPEUTIC EXERCISES: CPT | Performed by: OCCUPATIONAL THERAPIST

## 2020-07-07 PROCEDURE — 97022 WHIRLPOOL THERAPY: CPT | Performed by: OCCUPATIONAL THERAPIST

## 2020-07-07 PROCEDURE — 97140 MANUAL THERAPY 1/> REGIONS: CPT | Performed by: OCCUPATIONAL THERAPIST

## 2020-07-07 NOTE — PROGRESS NOTES
OCCUPATIONAL THERAPY PROGRESS NOTE    Date:  2020      Initial Evaluation Date: 20                                Evaluating Therapist: Sukumar Lux     Patient Name:  Cosmo Pena                         :  1970     Restrictions/Precautions:  Per protocol, low fall risk  Diagnosis:  CTR, trigger finger release R thumb                                                        Insurance/Certification information:  Medical [place]  Referring Practitioner:  DR Georgiana Cee  Referring Practitioner specific orders: rom, progress as ivan  Date of Surgery/Injury: , 13 days post op  Plan of care signed (Y/N):  no  Visit# / total visits:      Pain Level: 3 on scale of 1-10, aching    Subjective: \"Worried because my wrist has been aching since recent fall outside on my hand. \"      Objective:  Updated POC to be completed by 20  INTERVENTION: COMPLETED: SPECIFICS/COMMENTS:   Modality:     fluido x 10 min        AROM:     Small peg activity  10 min   Towel thumb ex's x 15x3   Nerve glides x 5 reps   Prayer stretches and wrist flex stretches x 10x   Tendon glides x 10x2   Thumb all planes x 2 sets   Taryn activity x 10 ,  min   AAROM:               PROM/Stretching:     Thumb wrist as ivan all planes x 15x2        Scar Mass/Edema Control:     Soft tissue incisions x 10 min        Strengthening:               Other: TA     Simulated job tasks  15 min with graded items for push and extend   kinesio taping x Thumb wrist to help decrease tenderness     Assessment/Comments: Pt is making Good progress toward stated plan of care. Softening of scar tissue noted with soft tissue mob. Good ivan with exercises. Improving thumb and wrist mobiltity is noted.  Will progress as ivan.     -Rehab Potential: Good    -Requires OT Follow Up: Yes  Time In:1          Time Out: 2               Treatment Charges: Mins Units   Modalities: Fluidotherapy 15 1   Ther Exercise 10 1   Manual Therapy 20 1   Thera Activities 15 1   ADL/Home Mgt      Neuro Re-education     Gait Training     Group Therapy     Non-Billable Service Time     Other     Total Time/Units 60 4     Goals: Goals for pt can be seen on initial evaluation. Plan:   [x]  Continues Plan of care: Treatment covered based on POC and graduated to patient's progress. Pt education continues at each visit to obtain maximum benefits from skilled OT intervention.   []  Alter Plan of care:   []  Discharge:      Taco Saini OT/L, [address]

## 2020-07-14 ENCOUNTER — HOSPITAL ENCOUNTER (OUTPATIENT)
Dept: OCCUPATIONAL THERAPY | Age: 50
Setting detail: THERAPIES SERIES
Discharge: HOME | End: 2020-07-14
Payer: COMMERCIAL

## 2020-07-14 PROCEDURE — 97110 THERAPEUTIC EXERCISES: CPT | Performed by: OCCUPATIONAL THERAPIST

## 2020-07-14 PROCEDURE — 97035 APP MDLTY 1+ULTRASOUND EA 15: CPT | Performed by: OCCUPATIONAL THERAPIST

## 2020-07-14 PROCEDURE — 97140 MANUAL THERAPY 1/> REGIONS: CPT | Performed by: OCCUPATIONAL THERAPIST

## 2020-07-14 NOTE — PROGRESS NOTES
OCCUPATIONAL THERAPY PROGRESS NOTE    Date:  2020      Initial Evaluation Date: 20                                Evaluating Therapist: Anthony Calzada     Patient Name:  Danial Epperson                         :  1970     Restrictions/Precautions:  Per protocol, low fall risk  Diagnosis:  CTR, trigger finger release R thumb                                                        Insurance/Certification information:  Medical [place]  Referring Practitioner:  DR Radha Cardona  Referring Practitioner specific orders: rom, progress as ivan  Date of Surgery/Injury: , 5 weeks  post op 7/15/20  Plan of care signed (Y/N):  no  Visit# / total visits:      Pain Level: 3 on scale of 1-10, aching    Subjective: \"Doing better each day but still feels like a stitch in my wrist and feel an ache in my wrist. 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 Will see doctor next thursday \"      Objective:  Updated POC to be completed by 20  INTERVENTION: COMPLETED: SPECIFICS/COMMENTS:   Modality:     fluido  10 min   US x 8 min   AROM:     Small peg activity  10 min   Towel thumb ex's x 15x3   Nerve glides x 5 reps   Prayer stretches and wrist flex stretches x 10x   Tendon glides x 10x2   Thumb all planes x 2 sets   Taryn activity x 10 ,  min   AAROM:               PROM/Stretching:     Thumb wrist as ivan all planes x 15x2        Scar Mass/Edema Control:     Soft tissue incisions x 10 min        Strengthening:     Putty roll and pinch/ x Began light strengthening for prepare for work. Other: TA     Simulated job tasks  15 min with graded items for push and extend   kinesio taping x Thumb wrist to help decrease tenderness     Assessment/Comments: Pt is making Good progress toward stated plan of care. Softening of scar tissue noted with soft tissue mob. Good ivan with exercises. Improving thumb and wrist mobiltity is noted.  To progress strengthening as ivan.      -Rehab Potential: Good    -Requires OT Follow Up: Yes  Time In:1          Time Out: 2               Treatment Charges: Mins Units   Modalities: US 3.3/100% 15 1   Ther Exercise 10 1   Manual Therapy 20 1   Thera Activities 15 1   ADL/Home Mgt      Neuro Re-education     Gait Training     Group Therapy     Non-Billable Service Time     Other     Total Time/Units 60 4     Goals: Goals for pt can be seen on initial evaluation. Plan:   [x]  Continues Plan of care: Treatment covered based on POC and graduated to patient's progress. Pt education continues at each visit to obtain maximum benefits from skilled OT intervention.   []  Alter Plan of care:   []  Discharge:      Tanvir Bellamy OT/L, [address]

## 2020-07-17 ENCOUNTER — HOSPITAL ENCOUNTER (OUTPATIENT)
Dept: OCCUPATIONAL THERAPY | Age: 50
Setting detail: THERAPIES SERIES
Discharge: HOME | End: 2020-07-17
Payer: COMMERCIAL

## 2020-07-17 PROCEDURE — 97035 APP MDLTY 1+ULTRASOUND EA 15: CPT | Performed by: OCCUPATIONAL THERAPIST

## 2020-07-17 PROCEDURE — 97022 WHIRLPOOL THERAPY: CPT | Performed by: OCCUPATIONAL THERAPIST

## 2020-07-17 PROCEDURE — 97110 THERAPEUTIC EXERCISES: CPT | Performed by: OCCUPATIONAL THERAPIST

## 2020-07-17 PROCEDURE — 97140 MANUAL THERAPY 1/> REGIONS: CPT | Performed by: OCCUPATIONAL THERAPIST

## 2020-07-17 NOTE — PROGRESS NOTES
OCCUPATIONAL THERAPY PROGRESS NOTE    Date:  2020      Initial Evaluation Date: 20                                Evaluating Therapist: David Mccallum     Patient Name:  Regino Lucio                         :  1970     Restrictions/Precautions:  Per protocol, low fall risk  Diagnosis:  CTR, trigger finger release R thumb                                                        Insurance/Certification information:  Medical [place]  Referring Practitioner:  DR Inocencio Poole  Referring Practitioner specific orders: rom, progress as ivan  Date of Surgery/Injury: , 5 weeks  post op 7/15/20  Plan of care signed (Y/N):  Y  Visit# / total visits:      Pain Level: 3 on scale of 1-10, aching    Subjective: Pt reports \"My thumb is feeling much better this week compared to last week. But it still feels like a bruise in the palm of my hand. \"     Objective:  Updated POC to be completed by 20  INTERVENTION: COMPLETED: SPECIFICS/COMMENTS:   Modality:     fluido x 15 min   US x 8 min   AROM:     Small peg activity  10 min   Towel thumb ex's x 15x3 and full fist   Nerve glides  5 reps   Prayer stretches and wrist flex stretches x 10x   Tendon glides  10x2   Thumb all planes x 2 sets   Pro to Sup w/ Lateral Pinch x W/ playing cards whole deck. In-hand manipulation x Chinese medicine balls   Taryn activity  10 ,  min   AAROM:               PROM/Stretching:     Thumb wrist as ivan all planes x 15x2   Light Putty Palm Presses x For scar elasticity and to stretch thenar eminence. Scar Mass/Edema Control:     Soft tissue incisions x 10 min        Strengthening:     Putty roll and pinch/ x Began light strengthening for prepare for work. Other: TA     Simulated job tasks  15 min with graded items for push and extend   kinesio taping x Thumb wrist to help decrease tenderness     Assessment/Comments: Pt is making Good progress toward stated plan of care. Improved thumb mobility.  Tenderness at base of thenar eminence. Will continue with US to promote healing and break down scar tissue. Pt feels unprepared at this time to RTW d/t work being very repititous and won't be able to break as she will work alone.    -Rehab Potential: Good    -Requires OT Follow Up: Yes  Time In: 9:30         Time Out: 10:30               Treatment Charges: Mins Units   Modalities: US 3.3/100% 15/8 1/1   Ther Exercise 22 1   Manual Therapy 15 1   Thera Activities     ADL/Home Mgt      Neuro Re-education     Gait Training     Group Therapy     Non-Billable Service Time     Other     Total Time/Units 60 4     Goals: Goals for pt can be seen on initial evaluation. Plan:   [x]  Continues Plan of care: Treatment covered based on POC and graduated to patient's progress. Pt education continues at each visit to obtain maximum benefits from skilled OT intervention.   []  Alter Plan of care:   []  Discharge:      Rasia Castillo Michael 85, OTR/L #[id number]

## 2020-07-20 ENCOUNTER — OFFICE VISIT (OUTPATIENT)
Dept: ORTHOPEDIC SURGERY | Age: 50
End: 2020-07-20

## 2020-07-20 VITALS — HEIGHT: 66 IN | BODY MASS INDEX: 29.25 KG/M2 | WEIGHT: 182 LBS | RESPIRATION RATE: 14 BRPM | TEMPERATURE: 97.2 F

## 2020-07-20 PROCEDURE — 99024 POSTOP FOLLOW-UP VISIT: CPT | Performed by: ORTHOPAEDIC SURGERY

## 2020-07-20 NOTE — LETTER
[address] [facility].  [address] [address]  Phone: [phone]  Fax: [phone]    Ferny Fernandez MD        July 20, 2020     Patient: Zacarias Mccoy   YOB: 1970   Date of Visit: 7/20/2020       To Whom It May Concern: It is my medical opinion that Maurilio Nichols should remain off work to continue occupational therapy post carpal tunnel surgery. Released to return to work Monday, August 3. If you have any questions or concerns, please don't hesitate to call.     Sincerely,        Ferny Fernandez MD

## 2020-07-21 ENCOUNTER — HOSPITAL ENCOUNTER (OUTPATIENT)
Dept: OCCUPATIONAL THERAPY | Age: 50
Setting detail: THERAPIES SERIES
Discharge: HOME | End: 2020-07-21
Payer: COMMERCIAL

## 2020-07-21 PROCEDURE — 97022 WHIRLPOOL THERAPY: CPT | Performed by: OCCUPATIONAL THERAPIST

## 2020-07-21 PROCEDURE — 97110 THERAPEUTIC EXERCISES: CPT | Performed by: OCCUPATIONAL THERAPIST

## 2020-07-21 PROCEDURE — 97140 MANUAL THERAPY 1/> REGIONS: CPT | Performed by: OCCUPATIONAL THERAPIST

## 2020-07-21 NOTE — PROGRESS NOTES
OCCUPATIONAL THERAPY PROGRESS NOTE    Date:  2020      Initial Evaluation Date: 20                                Evaluating Therapist: Gautam Rivera     Patient Name:  Chelsie Mckeon                         :  1970     Restrictions/Precautions:  Per protocol, low fall risk  Diagnosis:  CTR, trigger finger release R thumb                                                        Insurance/Certification information:  Medical [place]  Referring Practitioner:  DR Emeterio Larios  Referring Practitioner specific orders: rom, progress as ivan  Date of Surgery/Injury: , 5 weeks  post op 7/15/20  Plan of care signed (Y/N):  Y  Visit# / total visits:      Pain Level: 3 on scale of 1-10, aching    Subjective: Pt reports \"Doing a lot better the past few days. Thumb is feeling stronger and less pain to min. Doctor has released me to RTW in two weeks and wants strengthening. \"     Objective:  Updated POC to be completed by 20  INTERVENTION: COMPLETED: SPECIFICS/COMMENTS:   Modality:     fluido x 15 min   US x 8 min   AROM:     Small peg activity  10 min   Towel thumb ex's  15x3 and full fist   Nerve glides  5 reps   Prayer stretches and wrist flex stretches x 10x   Tendon glides  10x2   Thumb all planes x 2 sets   Pro to Sup w/ Lateral Pinch  W/ playing cards whole deck. In-hand manipulation x Chinese medicine balls   Taryn activity  10 ,  min   AAROM:               PROM/Stretching:     Thumb wrist as ivan all planes x 15x2   Light Putty Palm Presses x For scar elasticity and to stretch thenar eminence. Scar Mass/Edema Control:     Soft tissue incisions x 10 min to improve tissue mobility        Strengthening:     Putty and pegs x 10 min for soft tissue and finger manipulation   Putty and tools x 10 min for pinch and hand strengthening   Putty roll and pinch/ x Began light strengthening for prepare for work.    clothespins x Green, blue , black for pinch strengthening   Other: TA

## 2020-07-22 ENCOUNTER — HOSPITAL ENCOUNTER (OUTPATIENT)
Dept: GENERAL RADIOLOGY | Age: 50
Discharge: HOME | End: 2020-07-24
Payer: COMMERCIAL

## 2020-07-22 PROCEDURE — 77063 BREAST TOMOSYNTHESIS BI: CPT

## 2020-07-24 ENCOUNTER — HOSPITAL ENCOUNTER (OUTPATIENT)
Dept: OCCUPATIONAL THERAPY | Age: 50
Setting detail: THERAPIES SERIES
Discharge: HOME | End: 2020-07-24
Payer: COMMERCIAL

## 2020-07-24 PROCEDURE — 97110 THERAPEUTIC EXERCISES: CPT | Performed by: OCCUPATIONAL THERAPIST

## 2020-07-24 PROCEDURE — 97140 MANUAL THERAPY 1/> REGIONS: CPT | Performed by: OCCUPATIONAL THERAPIST

## 2020-07-24 PROCEDURE — 97018 PARAFFIN BATH THERAPY: CPT | Performed by: OCCUPATIONAL THERAPIST

## 2020-07-24 NOTE — PROGRESS NOTES
OCCUPATIONAL THERAPY PROGRESS NOTE    Date:  2020      Initial Evaluation Date: 20                                Evaluating Therapist: Arlin Mancilla     Patient Name:  Nic Lares                         :  1970     Restrictions/Precautions:  Per protocol, low fall risk  Diagnosis:  CTR, trigger finger release R thumb                                                        Insurance/Certification information:  Medical [place]  Referring Practitioner:  DR Pat Hooper  Referring Practitioner specific orders: rom, progress as ivan  Date of Surgery/Injury: , 5 weeks  post op 7/15/20  Plan of care signed (Y/N):  Y  Visit# / total visits:      Pain Level: 1-2 on scale of 1-10, aching    Subjective: Pt reports \"I just feel like I need to get some re-conditioning in before RTW, he pulled that stitch out and I feel so much better. Whatever you did last Friday made my thumb feel so much looser. \"    Objective:  Updated POC to be completed by 20  INTERVENTION: COMPLETED: SPECIFICS/COMMENTS:   Modality:     fluido  15 min   Paraffin  x 10 min for scar remodel and soft tissue warm-up. Gripping paraffin to follow. US  8 min   AROM:     Small peg activity  10 min   Towel thumb ex's  15x3 and full fist   Nerve glides  5 reps   Prayer stretches and wrist flex stretches x 10x   Tendon glides  10x2   Thumb all planes x 2 sets   Pro to Sup w/ Lateral Pinch  W/ playing cards whole deck. In-hand manipulation  Chinese medicine balls   Taryn activity  10 ,  min   AAROM:               PROM/Stretching:     Thumb wrist as ivan all planes x 15x2   Medium Putty Palm Presses x For scar elasticity and to stretch thenar eminence. Scar Mass/Edema Control:     Soft tissue incisions x 10 min to improve tissue mobility and break up scar tissue.         Strengthening:     Putty and pegs  10 min for soft tissue and finger manipulation   Putty and tools  10 min for pinch and hand strengthening   Hand

## 2020-07-27 ENCOUNTER — HOSPITAL ENCOUNTER (OUTPATIENT)
Dept: OCCUPATIONAL THERAPY | Age: 50
Setting detail: THERAPIES SERIES
Discharge: HOME | End: 2020-07-27
Payer: COMMERCIAL

## 2020-07-27 PROCEDURE — 97140 MANUAL THERAPY 1/> REGIONS: CPT | Performed by: OCCUPATIONAL THERAPIST

## 2020-07-27 PROCEDURE — 97022 WHIRLPOOL THERAPY: CPT | Performed by: OCCUPATIONAL THERAPIST

## 2020-07-27 PROCEDURE — 97110 THERAPEUTIC EXERCISES: CPT | Performed by: OCCUPATIONAL THERAPIST

## 2020-07-27 NOTE — PROGRESS NOTES
OCCUPATIONAL THERAPY PROGRESS NOTE    Date:  2020      Initial Evaluation Date: 20                                Evaluating Therapist: Gautam Rivera     Patient Name:  Chelsie Mckeon                         :  1970     Restrictions/Precautions:  Per protocol, low fall risk  Diagnosis:  CTR, trigger finger release R thumb                                                        Insurance/Certification information:  Medical [place]  Referring Practitioner:  DR Emeterio Larios  Referring Practitioner specific orders: rom, progress as ivan  Date of Surgery/Injury: , 5 weeks  post op 7/15/20  Plan of care signed (Y/N):  Y  Visit# / total visits:      Pain Level: 1-2 on scale of 1-10, aching    Subjective: Pt reports Doing good overall, RTW aug 3rd. This could be my last week of therapy. \"  Objective:  Updated POC to be completed by 20  INTERVENTION: COMPLETED: SPECIFICS/COMMENTS:   Modality:     fluido  15 min   Paraffin  x 10 min for scar remodel and soft tissue warm-up. Gripping paraffin to follow. US  8 min   AROM:     Small peg activity  10 min   Towel thumb ex's  15x3 and full fist   Nerve glides  5 reps   Prayer stretches and wrist flex stretches x 10x   Tendon glides  10x2   Thumb all planes x 2 sets   Pro to Sup w/ Lateral Pinch  W/ playing cards whole deck. In-hand manipulation  Chinese medicine balls   Taryn activity  10 ,  min   AAROM:               PROM/Stretching:     Thumb wrist as ivan all planes x 15x2   Medium Putty Palm Presses x For scar elasticity and to stretch thenar eminence. Scar Mass/Edema Control:     Soft tissue incisions x 10 min to improve tissue mobility and break up scar tissue.         Strengthening:     Putty and pegs x 10 min for soft tissue and finger manipulation   Putty and tools  10 min for pinch and hand strengthening/green putty   Hand Gripper x 2x15 reps Level 2 resistance w/ 3 sec holds   Velcro Board  Lateral pinch x 2 10 reps ea   Red Digi-Flex x 15 reps alternating digits   Putty roll and pinch/ x Began light strengthening for prepare for work. clothespins x Green, blue , black for pinch strengthening   Other: TA     Simulated job tasks  15 min with graded items for push and extend   kinesio taping  Thumb wrist to help decrease tenderness     Assessment/Comments: Pt is making Good progress toward stated plan of care. Good ivan with strengthening exercises. Strength progressing well. No restrictions with homemaking tasks. Pt. To cont for one more session and d/c to hep.     -Rehab Potential: Good    -Requires OT Follow Up: Yes  Time In: 9:30         Time Out: 10:30             Treatment Charges: Mins Units   Modalities: fluido 10 1   Ther Exercise 30 2   Manual Therapy 15 1   Thera Activities     ADL/Home Mgt      Neuro Re-education     Gait Training     Group Therapy     Non-Billable Service Time     Other     Total Time/Units 55 4     Goals: Goals for pt can be seen on initial evaluation. Plan:   [x]  Continues Plan of care: Treatment covered based on POC and graduated to patient's progress. Pt education continues at each visit to obtain maximum benefits from skilled OT intervention.   []  Alter Plan of care:   []  Discharge: one more visit and d/c to hep      Raisa Loomis Michael 85, OTR/L #[id number]

## 2020-07-31 ENCOUNTER — HOSPITAL ENCOUNTER (OUTPATIENT)
Dept: OCCUPATIONAL THERAPY | Age: 50
Setting detail: THERAPIES SERIES
Discharge: HOME | End: 2020-07-31
Payer: COMMERCIAL

## 2020-07-31 PROCEDURE — 97022 WHIRLPOOL THERAPY: CPT

## 2020-07-31 PROCEDURE — 97530 THERAPEUTIC ACTIVITIES: CPT

## 2020-07-31 PROCEDURE — 97110 THERAPEUTIC EXERCISES: CPT

## 2020-07-31 PROCEDURE — 97140 MANUAL THERAPY 1/> REGIONS: CPT

## 2020-07-31 NOTE — PROGRESS NOTES
OCCUPATIONAL THERAPY PROGRESS NOTE    Date:  2020      Initial Evaluation Date: 20                                Evaluating Therapist: Coco Ward     Patient Name:  Familia Dean                         :  1970     Restrictions/Precautions:  Per protocol, low fall risk  Diagnosis:  CTR, trigger finger release R thumb                                                        Insurance/Certification information:  Medical [place]  Referring Practitioner:  DR Mayra Steven  Referring Practitioner specific orders: rom, progress as ivan  Date of Surgery/Injury: , 5 weeks  post op 7/15/20  Plan of care signed (Y/N):  Y  Visit# / total visits:      Pain Level: 1-2 on scale of 1-10, aching    Subjective: CC is thumb weakness and cramping with writing tasks. Objective:  Updated POC to be completed by 20  INTERVENTION: COMPLETED: SPECIFICS/COMMENTS:   Modality:     fluido x R UE: Completes x15 min to promote tissue healing, skin desensitization, reduce inflammation, and decrease pain. Actively completed SROM in all available planes with holds at end range during treatment     Paraffin   10 min for scar remodel and soft tissue warm-up. Gripping paraffin to follow. US  8 min   AROM:     Small peg activity  10 min   Towel thumb ex's  15x3 and full fist   Nerve glides  5 reps   Prayer stretches and wrist flex stretches x 10x   Tendon glides  10x2   Thumb all planes x 2 sets   Pro to Sup w/ Lateral Pinch  W/ playing cards whole deck. In-hand manipulation  Chinese medicine balls   Taryn activity  10 ,  min   AAROM:               PROM/Stretching:     Thumb wrist as ivan all planes x 15x2   Medium Putty Palm Presses x For scar elasticity and to stretch thenar eminence. Scar Mass/Edema Control:     Soft tissue incisions x 10 min to improve tissue mobility and break up scar tissue.         Strengthening:     Putty and pegs x 10 min for soft tissue and finger manipulation   Putty and

## 2020-08-15 ENCOUNTER — EMPLOYEE WELLNESS (OUTPATIENT)
Dept: OTHER | Age: 50
End: 2020-08-15

## 2020-08-15 LAB
CHOLESTEROL, TOTAL: 181 MG/DL (ref 0–199)
GLUCOSE BLD-MCNC: 96 MG/DL (ref 74–107)
HDLC SERPL-MCNC: 43 MG/DL
LDL CHOLESTEROL CALCULATED: 111 MG/DL (ref 0–99)
TRIGL SERPL-MCNC: 133 MG/DL (ref 0–149)

## 2020-10-19 VITALS — BODY MASS INDEX: 29.21 KG/M2 | WEIGHT: 181 LBS

## 2020-12-07 LAB

## 2020-12-08 LAB
AVERAGE GLUCOSE: NORMAL
CHOLESTEROL, TOTAL: 209 MG/DL
CHOLESTEROL/HDL RATIO: 4.2
HBA1C MFR BLD: 5.2 %
HDLC SERPL-MCNC: 50 MG/DL (ref 35–70)
LDL CHOLESTEROL CALCULATED: 130 MG/DL (ref 0–160)
NONHDLC SERPL-MCNC: 159 MG/DL
TRIGL SERPL-MCNC: 176 MG/DL
VLDLC SERPL CALC-MCNC: NORMAL MG/DL

## 2020-12-08 NOTE — H&P
[address]                  [address] [facility]  [facility]                              HISTORY AND PHYSICAL    PATIENT NAME: Orlin Rinne                 :        1970  MED REC NO:   [id number]                            ROOM:  ACCOUNT NO:   [de-identified]                           ADMIT DATE: 2020  PROVIDER:     Jennifer Nolasco MD    DATE OF SURGERY:  2020. CHIEF COMPLAINT:  For breast reduction surgery. HISTORY OF PRESENT ILLNESS:  This is a 53-year-old white female  evaluated in the office in 10/2020 and 2020 concerning symptomatic  macromastia. She has three children. She is a 40G bra size, feels she  is even left and right. She has complaints of significant neck pain,  back pain, shoulder pain, pain in the breast themselves, rashes under  the crease of the breast requiring prescription of antifungal creams,  clothing activities are difficult. She has had a mammogram done in  2020, which was okay. There is no family history of breast cancer. She had a cyst of the left breast removed at age 23. All risks and  complications and expectations for the patient were discussed in detail. PAST MEDICAL HISTORY:  Hypertension. MEDICATIONS:  Include atorvastatin 10 mg daily, amlodipine 5 mg daily. PREVIOUS SURGERIES:  Includes a  30 years ago and carpal tunnel  in 2020. SOCIAL HISTORY:  Smokes about 5 to 10 cigarettes per day. REVIEW OF SYSTEMS:  HEAD, EYES, EARS, NOSE, AND THROAT:  No complaints. RESPIRATORY:  No history of asthma, wheezing, cough, sputum, hemoptysis,  TB, PE, or bronchopneumonia. CARDIOVASCULAR:  No history of MI, angina, rheumatic heart disease,  murmur, or hypertension. GI:  No history of ulcers, hepatitis, melena, or hematochezia. :  No dysuria, hematuria, frequency, or caliber changes. NEUROLOGIC:  No history of seizures, stroke, or psychiatric illness.     PHYSICAL EXAMINATION:  HEAD, EYES, EARS, NOSE, AND THROAT:  Pupils are equal, round, and  reactive to light and accommodation. Extraocular muscles intact. Sclerae and conjunctivae normal.  LUNGS:  Clear to auscultation and percussion. GYN:  Deferred. EXTREMITIES:  Grossly normal.  NEUROLOGIC:  Cranial nerves II through XII grossly intact. BREASTS:  Physical examination of the breast reveals severe dense  breasts bilaterally. No discrete masses, nipple discharge or axillary  lymphadenopathy. Right breast is larger than the left, significant  ptosis. CLINICAL IMPRESSION:  1. Symptomatic macromastia. 2.  Hypertension. Ideally, I would like to do this as an overnight stay, but in light of  the COVID situation, she will be kept several hours in the recovery  room, and then will be evaluated in the immediate postop period in the  office.         Ruben Quintanilla MD    D: 12/07/2020 16:36:09       T: 12/07/2020 17:37:54     [email or web address]  Job#: [id number]     Doc#: [id number]

## 2020-12-09 ENCOUNTER — HOSPITAL ENCOUNTER (OUTPATIENT)
Age: 50
Discharge: HOME | End: 2020-12-11
Payer: COMMERCIAL

## 2020-12-09 PROCEDURE — U0003 INFECTIOUS AGENT DETECTION BY NUCLEIC ACID (DNA OR RNA); SEVERE ACUTE RESPIRATORY SYNDROME CORONAVIRUS 2 (SARS-COV-2) (CORONAVIRUS DISEASE [COVID-19]), AMPLIFIED PROBE TECHNIQUE, MAKING USE OF HIGH THROUGHPUT TECHNOLOGIES AS DESCRIBED BY CMS-2020-01-R: HCPCS

## 2020-12-11 LAB
SARS-COV-2: NOT DETECTED
SOURCE: NORMAL

## 2020-12-11 RX ORDER — AMLODIPINE BESYLATE 5 MG/1
5 TABLET ORAL EVERY MORNING
COMMUNITY
End: 2021-08-05 | Stop reason: SDUPTHER

## 2020-12-15 ENCOUNTER — ANESTHESIA EVENT (OUTPATIENT)
Dept: OPERATING ROOM | Age: 50
End: 2020-12-15
Payer: COMMERCIAL

## 2020-12-16 ENCOUNTER — HOSPITAL ENCOUNTER (OUTPATIENT)
Age: 50
Setting detail: OUTPATIENT SURGERY
Discharge: HOME | End: 2020-12-16
Attending: SURGERY | Admitting: SURGERY
Payer: COMMERCIAL

## 2020-12-16 ENCOUNTER — ANESTHESIA (OUTPATIENT)
Dept: OPERATING ROOM | Age: 50
End: 2020-12-16
Payer: COMMERCIAL

## 2020-12-16 VITALS
HEART RATE: 95 BPM | RESPIRATION RATE: 16 BRPM | WEIGHT: 184 LBS | HEIGHT: 67 IN | OXYGEN SATURATION: 96 % | DIASTOLIC BLOOD PRESSURE: 64 MMHG | BODY MASS INDEX: 28.88 KG/M2 | TEMPERATURE: 97.3 F | SYSTOLIC BLOOD PRESSURE: 116 MMHG

## 2020-12-16 VITALS — SYSTOLIC BLOOD PRESSURE: 137 MMHG | OXYGEN SATURATION: 100 % | DIASTOLIC BLOOD PRESSURE: 103 MMHG | TEMPERATURE: 95.4 F

## 2020-12-16 PROCEDURE — 2500000003 HC RX 250 WO HCPCS

## 2020-12-16 PROCEDURE — 7100000000 HC PACU RECOVERY - FIRST 15 MIN: Performed by: SURGERY

## 2020-12-16 PROCEDURE — 7100000011 HC PHASE II RECOVERY - ADDTL 15 MIN: Performed by: SURGERY

## 2020-12-16 PROCEDURE — 3700000000 HC ANESTHESIA ATTENDED CARE: Performed by: SURGERY

## 2020-12-16 PROCEDURE — 2580000003 HC RX 258: Performed by: SURGERY

## 2020-12-16 PROCEDURE — 6370000000 HC RX 637 (ALT 250 FOR IP): Performed by: STUDENT IN AN ORGANIZED HEALTH CARE EDUCATION/TRAINING PROGRAM

## 2020-12-16 PROCEDURE — 6360000002 HC RX W HCPCS: Performed by: ANESTHESIOLOGY

## 2020-12-16 PROCEDURE — 2500000003 HC RX 250 WO HCPCS: Performed by: SURGERY

## 2020-12-16 PROCEDURE — 3700000001 HC ADD 15 MINUTES (ANESTHESIA): Performed by: SURGERY

## 2020-12-16 PROCEDURE — 88305 TISSUE EXAM BY PATHOLOGIST: CPT

## 2020-12-16 PROCEDURE — 6360000002 HC RX W HCPCS: Performed by: SURGERY

## 2020-12-16 PROCEDURE — 7100000001 HC PACU RECOVERY - ADDTL 15 MIN: Performed by: SURGERY

## 2020-12-16 PROCEDURE — 6370000000 HC RX 637 (ALT 250 FOR IP): Performed by: SURGERY

## 2020-12-16 PROCEDURE — 7100000010 HC PHASE II RECOVERY - FIRST 15 MIN: Performed by: SURGERY

## 2020-12-16 PROCEDURE — 3600000013 HC SURGERY LEVEL 3 ADDTL 15MIN: Performed by: SURGERY

## 2020-12-16 PROCEDURE — 6360000002 HC RX W HCPCS

## 2020-12-16 PROCEDURE — 2580000003 HC RX 258

## 2020-12-16 PROCEDURE — 2709999900 HC NON-CHARGEABLE SUPPLY: Performed by: SURGERY

## 2020-12-16 PROCEDURE — 3600000003 HC SURGERY LEVEL 3 BASE: Performed by: SURGERY

## 2020-12-16 RX ORDER — SODIUM CHLORIDE 0.9 % (FLUSH) 0.9 %
10 SYRINGE (ML) INJECTION EVERY 12 HOURS SCHEDULED
Status: DISCONTINUED | OUTPATIENT
Start: 2020-12-16 | End: 2020-12-16 | Stop reason: HOSPADM

## 2020-12-16 RX ORDER — FENTANYL CITRATE 50 UG/ML
INJECTION, SOLUTION INTRAMUSCULAR; INTRAVENOUS PRN
Status: DISCONTINUED | OUTPATIENT
Start: 2020-12-16 | End: 2020-12-16 | Stop reason: SDUPTHER

## 2020-12-16 RX ORDER — SODIUM CHLORIDE 0.9 % (FLUSH) 0.9 %
10 SYRINGE (ML) INJECTION PRN
Status: CANCELLED | OUTPATIENT
Start: 2020-12-16

## 2020-12-16 RX ORDER — SODIUM CHLORIDE, SODIUM LACTATE, POTASSIUM CHLORIDE, CALCIUM CHLORIDE 600; 310; 30; 20 MG/100ML; MG/100ML; MG/100ML; MG/100ML
INJECTION, SOLUTION INTRAVENOUS CONTINUOUS PRN
Status: DISCONTINUED | OUTPATIENT
Start: 2020-12-16 | End: 2020-12-16 | Stop reason: SDUPTHER

## 2020-12-16 RX ORDER — ROCURONIUM BROMIDE 10 MG/ML
INJECTION, SOLUTION INTRAVENOUS PRN
Status: DISCONTINUED | OUTPATIENT
Start: 2020-12-16 | End: 2020-12-16 | Stop reason: SDUPTHER

## 2020-12-16 RX ORDER — HYDROCODONE BITARTRATE AND ACETAMINOPHEN 5; 325 MG/1; MG/1
1 TABLET ORAL ONCE
Status: COMPLETED | OUTPATIENT
Start: 2020-12-16 | End: 2020-12-16

## 2020-12-16 RX ORDER — DOCUSATE SODIUM 100 MG/1
100 CAPSULE, LIQUID FILLED ORAL 2 TIMES DAILY
Qty: 50 CAPSULE | Refills: 0 | Status: SHIPPED | OUTPATIENT
Start: 2020-12-16 | End: 2021-04-05

## 2020-12-16 RX ORDER — METHYLENE BLUE 10 MG/ML
INJECTION INTRAVENOUS PRN
Status: DISCONTINUED | OUTPATIENT
Start: 2020-12-16 | End: 2020-12-16 | Stop reason: ALTCHOICE

## 2020-12-16 RX ORDER — DEXAMETHASONE SODIUM PHOSPHATE 10 MG/ML
INJECTION INTRAMUSCULAR; INTRAVENOUS PRN
Status: DISCONTINUED | OUTPATIENT
Start: 2020-12-16 | End: 2020-12-16 | Stop reason: SDUPTHER

## 2020-12-16 RX ORDER — SODIUM CHLORIDE 0.9 % (FLUSH) 0.9 %
10 SYRINGE (ML) INJECTION EVERY 12 HOURS SCHEDULED
Status: CANCELLED | OUTPATIENT
Start: 2020-12-16

## 2020-12-16 RX ORDER — SODIUM CHLORIDE 9 MG/ML
INJECTION, SOLUTION INTRAVENOUS CONTINUOUS PRN
Status: DISCONTINUED | OUTPATIENT
Start: 2020-12-16 | End: 2020-12-16

## 2020-12-16 RX ORDER — HYDROCODONE BITARTRATE AND ACETAMINOPHEN 5; 325 MG/1; MG/1
1 TABLET ORAL EVERY 6 HOURS PRN
Qty: 28 TABLET | Refills: 0 | Status: SHIPPED | OUTPATIENT
Start: 2020-12-16 | End: 2020-12-23

## 2020-12-16 RX ORDER — LIDOCAINE HYDROCHLORIDE AND EPINEPHRINE 10; 10 MG/ML; UG/ML
INJECTION, SOLUTION INFILTRATION; PERINEURAL PRN
Status: DISCONTINUED | OUTPATIENT
Start: 2020-12-16 | End: 2020-12-16 | Stop reason: ALTCHOICE

## 2020-12-16 RX ORDER — LABETALOL HYDROCHLORIDE 5 MG/ML
INJECTION, SOLUTION INTRAVENOUS PRN
Status: DISCONTINUED | OUTPATIENT
Start: 2020-12-16 | End: 2020-12-16 | Stop reason: SDUPTHER

## 2020-12-16 RX ORDER — PROPOFOL 10 MG/ML
INJECTION, EMULSION INTRAVENOUS PRN
Status: DISCONTINUED | OUTPATIENT
Start: 2020-12-16 | End: 2020-12-16 | Stop reason: SDUPTHER

## 2020-12-16 RX ORDER — ONDANSETRON 4 MG/1
4 TABLET, FILM COATED ORAL DAILY PRN
Qty: 12 TABLET | Refills: 0 | Status: SHIPPED | OUTPATIENT
Start: 2020-12-16 | End: 2020-12-28

## 2020-12-16 RX ORDER — SODIUM CHLORIDE 0.9 % (FLUSH) 0.9 %
10 SYRINGE (ML) INJECTION PRN
Status: DISCONTINUED | OUTPATIENT
Start: 2020-12-16 | End: 2020-12-16 | Stop reason: HOSPADM

## 2020-12-16 RX ORDER — ROCURONIUM BROMIDE 10 MG/ML
INJECTION, SOLUTION INTRAVENOUS PRN
Status: DISCONTINUED | OUTPATIENT
Start: 2020-12-16 | End: 2020-12-16

## 2020-12-16 RX ORDER — LIDOCAINE HYDROCHLORIDE 20 MG/ML
INJECTION, SOLUTION INTRAVENOUS PRN
Status: DISCONTINUED | OUTPATIENT
Start: 2020-12-16 | End: 2020-12-16 | Stop reason: SDUPTHER

## 2020-12-16 RX ORDER — PROMETHAZINE HYDROCHLORIDE 25 MG/ML
6.25 INJECTION, SOLUTION INTRAMUSCULAR; INTRAVENOUS
Status: COMPLETED | OUTPATIENT
Start: 2020-12-16 | End: 2020-12-16

## 2020-12-16 RX ORDER — ONDANSETRON 2 MG/ML
INJECTION INTRAMUSCULAR; INTRAVENOUS PRN
Status: DISCONTINUED | OUTPATIENT
Start: 2020-12-16 | End: 2020-12-16 | Stop reason: SDUPTHER

## 2020-12-16 RX ORDER — METHYLPREDNISOLONE SODIUM SUCCINATE 125 MG/2ML
INJECTION, POWDER, LYOPHILIZED, FOR SOLUTION INTRAMUSCULAR; INTRAVENOUS PRN
Status: DISCONTINUED | OUTPATIENT
Start: 2020-12-16 | End: 2020-12-16 | Stop reason: SDUPTHER

## 2020-12-16 RX ADMIN — ROCURONIUM BROMIDE 20 MG: 10 INJECTION, SOLUTION INTRAVENOUS at 11:07

## 2020-12-16 RX ADMIN — LIDOCAINE HYDROCHLORIDE 100 MG: 20 INJECTION, SOLUTION INTRAVENOUS at 07:44

## 2020-12-16 RX ADMIN — PROMETHAZINE HYDROCHLORIDE 6.25 MG: 25 INJECTION INTRAMUSCULAR; INTRAVENOUS at 12:12

## 2020-12-16 RX ADMIN — FENTANYL CITRATE 100 MCG: 50 INJECTION, SOLUTION INTRAMUSCULAR; INTRAVENOUS at 11:07

## 2020-12-16 RX ADMIN — DEXAMETHASONE SODIUM PHOSPHATE 10 MG: 10 INJECTION INTRAMUSCULAR; INTRAVENOUS at 07:49

## 2020-12-16 RX ADMIN — LABETALOL HYDROCHLORIDE 5 MG: 5 INJECTION INTRAVENOUS at 09:06

## 2020-12-16 RX ADMIN — ROCURONIUM BROMIDE 20 MG: 10 INJECTION, SOLUTION INTRAVENOUS at 09:22

## 2020-12-16 RX ADMIN — SODIUM CHLORIDE, POTASSIUM CHLORIDE, SODIUM LACTATE AND CALCIUM CHLORIDE: 600; 310; 30; 20 INJECTION, SOLUTION INTRAVENOUS at 09:10

## 2020-12-16 RX ADMIN — FENTANYL CITRATE 100 MCG: 50 INJECTION, SOLUTION INTRAMUSCULAR; INTRAVENOUS at 08:10

## 2020-12-16 RX ADMIN — PROPOFOL 200 MG: 10 INJECTION, EMULSION INTRAVENOUS at 07:44

## 2020-12-16 RX ADMIN — ROCURONIUM BROMIDE 10 MG: 10 INJECTION, SOLUTION INTRAVENOUS at 08:54

## 2020-12-16 RX ADMIN — Medication 10 ML: at 06:38

## 2020-12-16 RX ADMIN — FENTANYL CITRATE 100 MCG: 50 INJECTION, SOLUTION INTRAMUSCULAR; INTRAVENOUS at 07:44

## 2020-12-16 RX ADMIN — HYDROCODONE BITARTRATE AND ACETAMINOPHEN 1 TABLET: 5; 325 TABLET ORAL at 14:30

## 2020-12-16 RX ADMIN — Medication 2 G: at 07:48

## 2020-12-16 RX ADMIN — METHYLPREDNISOLONE SODIUM SUCCINATE 125 MG: 125 INJECTION, POWDER, FOR SOLUTION INTRAMUSCULAR; INTRAVENOUS at 11:11

## 2020-12-16 RX ADMIN — LABETALOL HYDROCHLORIDE 10 MG: 5 INJECTION INTRAVENOUS at 08:48

## 2020-12-16 RX ADMIN — FENTANYL CITRATE 50 MCG: 50 INJECTION, SOLUTION INTRAMUSCULAR; INTRAVENOUS at 09:05

## 2020-12-16 RX ADMIN — ROCURONIUM BROMIDE 40 MG: 10 INJECTION, SOLUTION INTRAVENOUS at 07:46

## 2020-12-16 RX ADMIN — SODIUM CHLORIDE, POTASSIUM CHLORIDE, SODIUM LACTATE AND CALCIUM CHLORIDE: 600; 310; 30; 20 INJECTION, SOLUTION INTRAVENOUS at 07:33

## 2020-12-16 RX ADMIN — ROCURONIUM BROMIDE 10 MG: 10 INJECTION, SOLUTION INTRAVENOUS at 08:10

## 2020-12-16 RX ADMIN — SODIUM CHLORIDE, POTASSIUM CHLORIDE, SODIUM LACTATE AND CALCIUM CHLORIDE: 600; 310; 30; 20 INJECTION, SOLUTION INTRAVENOUS at 08:10

## 2020-12-16 RX ADMIN — ONDANSETRON HYDROCHLORIDE 4 MG: 2 INJECTION, SOLUTION INTRAMUSCULAR; INTRAVENOUS at 07:49

## 2020-12-16 RX ADMIN — FENTANYL CITRATE 50 MCG: 50 INJECTION, SOLUTION INTRAMUSCULAR; INTRAVENOUS at 09:12

## 2020-12-16 RX ADMIN — FENTANYL CITRATE 100 MCG: 50 INJECTION, SOLUTION INTRAMUSCULAR; INTRAVENOUS at 08:03

## 2020-12-16 RX ADMIN — ROCURONIUM BROMIDE 10 MG: 10 INJECTION, SOLUTION INTRAVENOUS at 07:45

## 2020-12-16 ASSESSMENT — PULMONARY FUNCTION TESTS
PIF_VALUE: 19
PIF_VALUE: 24
PIF_VALUE: 1
PIF_VALUE: 20
PIF_VALUE: 18
PIF_VALUE: 20
PIF_VALUE: 21
PIF_VALUE: 19
PIF_VALUE: 21
PIF_VALUE: 19
PIF_VALUE: 21
PIF_VALUE: 20
PIF_VALUE: 21
PIF_VALUE: 20
PIF_VALUE: 19
PIF_VALUE: 20
PIF_VALUE: 19
PIF_VALUE: 20
PIF_VALUE: 20
PIF_VALUE: 19
PIF_VALUE: 20
PIF_VALUE: 19
PIF_VALUE: 20
PIF_VALUE: 19
PIF_VALUE: 20
PIF_VALUE: 20
PIF_VALUE: 19
PIF_VALUE: 20
PIF_VALUE: 21
PIF_VALUE: 20
PIF_VALUE: 21
PIF_VALUE: 20
PIF_VALUE: 21
PIF_VALUE: 20
PIF_VALUE: 19
PIF_VALUE: 20
PIF_VALUE: 21
PIF_VALUE: 0
PIF_VALUE: 24
PIF_VALUE: 20
PIF_VALUE: 20
PIF_VALUE: 19
PIF_VALUE: 19
PIF_VALUE: 1
PIF_VALUE: 20
PIF_VALUE: 20
PIF_VALUE: 19
PIF_VALUE: 21
PIF_VALUE: 20
PIF_VALUE: 19
PIF_VALUE: 20
PIF_VALUE: 21
PIF_VALUE: 21
PIF_VALUE: 18
PIF_VALUE: 21
PIF_VALUE: 20
PIF_VALUE: 1
PIF_VALUE: 19
PIF_VALUE: 20
PIF_VALUE: 19
PIF_VALUE: 20
PIF_VALUE: 19
PIF_VALUE: 17
PIF_VALUE: 20
PIF_VALUE: 19
PIF_VALUE: 20
PIF_VALUE: 22
PIF_VALUE: 19
PIF_VALUE: 20
PIF_VALUE: 19
PIF_VALUE: 0
PIF_VALUE: 20
PIF_VALUE: 20
PIF_VALUE: 21
PIF_VALUE: 20
PIF_VALUE: 20
PIF_VALUE: 19
PIF_VALUE: 21
PIF_VALUE: 20
PIF_VALUE: 19
PIF_VALUE: 13
PIF_VALUE: 20
PIF_VALUE: 19
PIF_VALUE: 20
PIF_VALUE: 18
PIF_VALUE: 25
PIF_VALUE: 18
PIF_VALUE: 19
PIF_VALUE: 20
PIF_VALUE: 19
PIF_VALUE: 20
PIF_VALUE: 19
PIF_VALUE: 20
PIF_VALUE: 18
PIF_VALUE: 20
PIF_VALUE: 1
PIF_VALUE: 2
PIF_VALUE: 19
PIF_VALUE: 0
PIF_VALUE: 20
PIF_VALUE: 22
PIF_VALUE: 25
PIF_VALUE: 15
PIF_VALUE: 20
PIF_VALUE: 21
PIF_VALUE: 20
PIF_VALUE: 0
PIF_VALUE: 20
PIF_VALUE: 0
PIF_VALUE: 20
PIF_VALUE: 20
PIF_VALUE: 19
PIF_VALUE: 20
PIF_VALUE: 21
PIF_VALUE: 21
PIF_VALUE: 20
PIF_VALUE: 2
PIF_VALUE: 20
PIF_VALUE: 19
PIF_VALUE: 20
PIF_VALUE: 19
PIF_VALUE: 21
PIF_VALUE: 19
PIF_VALUE: 19
PIF_VALUE: 20
PIF_VALUE: 0
PIF_VALUE: 23
PIF_VALUE: 0
PIF_VALUE: 19
PIF_VALUE: 19
PIF_VALUE: 21
PIF_VALUE: 23
PIF_VALUE: 21
PIF_VALUE: 19
PIF_VALUE: 19
PIF_VALUE: 20
PIF_VALUE: 19
PIF_VALUE: 20
PIF_VALUE: 17
PIF_VALUE: 19
PIF_VALUE: 0
PIF_VALUE: 20
PIF_VALUE: 19
PIF_VALUE: 20
PIF_VALUE: 22
PIF_VALUE: 20
PIF_VALUE: 21
PIF_VALUE: 20
PIF_VALUE: 0
PIF_VALUE: 20
PIF_VALUE: 14
PIF_VALUE: 21
PIF_VALUE: 20
PIF_VALUE: 19
PIF_VALUE: 21
PIF_VALUE: 20
PIF_VALUE: 20
PIF_VALUE: 0
PIF_VALUE: 19
PIF_VALUE: 1
PIF_VALUE: 20
PIF_VALUE: 0
PIF_VALUE: 15
PIF_VALUE: 20
PIF_VALUE: 18
PIF_VALUE: 21
PIF_VALUE: 19
PIF_VALUE: 20
PIF_VALUE: 20
PIF_VALUE: 19
PIF_VALUE: 1
PIF_VALUE: 19
PIF_VALUE: 20
PIF_VALUE: 17
PIF_VALUE: 19
PIF_VALUE: 20
PIF_VALUE: 21
PIF_VALUE: 20
PIF_VALUE: 22
PIF_VALUE: 19
PIF_VALUE: 21
PIF_VALUE: 21
PIF_VALUE: 20
PIF_VALUE: 19
PIF_VALUE: 20
PIF_VALUE: 19
PIF_VALUE: 20
PIF_VALUE: 19
PIF_VALUE: 20
PIF_VALUE: 0
PIF_VALUE: 19
PIF_VALUE: 18
PIF_VALUE: 20
PIF_VALUE: 0
PIF_VALUE: 0

## 2020-12-16 ASSESSMENT — PAIN - FUNCTIONAL ASSESSMENT: PAIN_FUNCTIONAL_ASSESSMENT: 0-10

## 2020-12-16 ASSESSMENT — PAIN DESCRIPTION - PAIN TYPE: TYPE: SURGICAL PAIN

## 2020-12-16 ASSESSMENT — PAIN DESCRIPTION - DESCRIPTORS: DESCRIPTORS: ACHING;CONSTANT;DISCOMFORT

## 2020-12-16 ASSESSMENT — PAIN SCALES - GENERAL
PAINLEVEL_OUTOF10: 0
PAINLEVEL_OUTOF10: 5

## 2020-12-16 ASSESSMENT — PAIN DESCRIPTION - LOCATION: LOCATION: BREAST

## 2020-12-16 ASSESSMENT — PAIN DESCRIPTION - ORIENTATION: ORIENTATION: RIGHT;LEFT

## 2020-12-16 NOTE — BRIEF OP NOTE
Brief Postoperative Note      Patient: Sherman Michael  YOB: 1970  MRN: [id number]    Date of Procedure: 12/16/2020    Pre-Op Diagnosis: MACROMASTIA, MASTRODYNIA    Post-Op Diagnosis: Same       Procedure(s):  BILATERAL BREAST REDUCTION WITH POSSIBLE FREE NIPPLE GRAFTS, POSSIBLE LIPOSUCTION BILATERAL AXILLA    Surgeon(s):  Nitesh Telles MD    Assistant:  Resident: Karishma Vieira MD    Anesthesia: General    Estimated Blood Loss (mL): less than 241     Complications: None    Specimens:   ID Type Source Tests Collected by Time Destination   A : LEFT BREAST Tissue Tissue SURGICAL PATHOLOGY Nitesh Telles MD 12/16/2020 0914    B : Right Breast Tissue Tissue SURGICAL PATHOLOGY Nitesh Telles MD 12/16/2020 1486        Implants:  * No implants in log *      Drains:   Closed/Suction Drain Left Breast (Active)       Closed/Suction Drain Right Breast 15 [place] (Active)       Urethral Catheter Double-lumen 16 fr (Active)       Findings: macromastia    Electronically signed by Karishma Vieira MD on 12/16/2020 at 11:54 AM

## 2020-12-16 NOTE — ANESTHESIA POSTPROCEDURE EVALUATION
Department of Anesthesiology  Postprocedure Note    Patient: Royce Jones  MRN: [id number]  [place]: 1970  Date of evaluation: 12/16/2020  Time:  2:16 PM     Procedure Summary     Date: 12/16/20 Room / Location: [facility] OR 09 / [facility]    Anesthesia Start: 8888 Anesthesia Stop: 1487    Procedure: BILATERAL BREAST REDUCTION WITH POSSIBLE FREE NIPPLE GRAFTS, POSSIBLE LIPOSUCTION BILATERAL AXILLA (Bilateral Breast) Diagnosis: (MACROMASTIA, Carrie Bebeto)    Surgeons: Angie Melvin MD Responsible Provider: Eliza Rasmussen MD    Anesthesia Type: general ASA Status: 3          Anesthesia Type: general    Katherin Phase I: Katherin Score: 10    Katherin Phase II: Katherin Score: 10    Last vitals: Reviewed and per EMR flowsheets.        Anesthesia Post Evaluation    Patient location during evaluation: PACU  Patient participation: complete - patient participated  Level of consciousness: awake and alert  Pain score: 4  Airway patency: patent  Nausea & Vomiting: no vomiting and no nausea  Complications: no  Cardiovascular status: hemodynamically stable  Respiratory status: spontaneous ventilation  Hydration status: stable

## 2020-12-16 NOTE — INTERVAL H&P NOTE
Update History & Physical    The patient's History and Physical of December 7, 2020 was reviewed with the patient and I examined the patient. There was no change. The surgical site was confirmed by the patient and me. Plan: The risks, benefits, expected outcome, and alternative to the recommended procedure have been discussed with the patient. Patient understands and wants to proceed with the procedure.      Electronically signed by aPula Gleason MD on 12/16/2020 at 7:03 AM

## 2020-12-16 NOTE — PROGRESS NOTES
Covid testing done  Results negative  Self quarantine guidelines have been maintained  No unusual signs or symptoms to report  Screening questionnaire completed
Page sent to Dr. Tristen Marie regarding patient's drain site draining. Ordered for patient to just reinforce dressing. Patient to be discharged home at 1500 and PO [place] ordered in [facility] prior to patient discharging home.
Patient given discharge instructions. Patient and family verbalized understanding. No other questions at this time.
Pt instructed on use of incentive spirometer.
if you are to spend the night in the hospital.     PARKING INSTRUCTIONS:   [x] Arrival Time:__12/16/20___________  · [] Parking lot '\"I\"  is located on [facility] (the corner of [facility] and [facility]). To enter, press the button and the gate will lift. A free token will be provided to exit the lot. One car per patient is allowed to park in this lot. All other cars are to park on [address] [place] [address] either in the parking garage or the handicap lot. [x] To reach the [facility] lobby from [address] [place] [address], upon entering the hospital, take elevator B to the 3rd floor. EDUCATION INSTRUCTIONS:      [] Knee or hip replacement booklet & exercise pamphlets given. [] [address] placed in chart. [] Pre-admission Testing educational folder given  [] Incentive Spirometry,coughing & deep breathing exercises reviewed. []Medication information sheet(s)   []Fluoroscopy-Xray used in surgery reviewed with patient. Educational pamphlet placed in chart. []Pain: Post-op pain is normal and to be expected. You will be asked to rate your pain from 0-10(a zero is not acceptable-education is needed). Your post-op pain goal is:  [] Ask your nurse for your pain medication. [] Joint camp offered. [] Joint replacement booklets given. [] Other:___________________________    MEDICATION INSTRUCTIONS:   [x]Bring a complete list of your medications, please write the last time you took the medicine, give this list to the nurse. [x] Take the following medications the morning of surgery with 1-2 ounces of water:   [] Stop herbal supplements and vitamins 5 days before your surgery. [] DO NOT take any diabetic medicine the morning of surgery. Follow instructions for insulin the day before surgery. [] If you are diabetic and your blood sugar is low or you feel symptomatic, you may drink 1-2 ounces of apple juice or take a glucose tablet.   The morning of your procedure, you may call the pre-op

## 2020-12-16 NOTE — ANESTHESIA PRE PROCEDURE
Surgical History:        Procedure Laterality Date    CARPAL TUNNEL RELEASE Right 6/10/2020    RIGHT CARPAL TUNNEL RELEASE ENDOSCOPIC performed by Ailyn Garnica MD at [address] Right 6/10/2020    RIGHT THUMB FINGER TRIGGER RELEASE performed by Ailyn Garnica MD at [address] [facility]         Social History:    Social History     Tobacco Use    Smoking status: Former Smoker     Packs/day: 0.25     Types: Cigarettes    Smokeless tobacco: Never Used   Substance Use Topics    Alcohol use: No     Alcohol/week: 0.0 standard drinks                                Counseling given: Not Answered      Vital Signs (Current):   Vitals:    12/11/20 1436 12/16/20 0618   Weight: 184 lb (83.5 kg) 184 lb (83.5 kg)   Height: 5' 7\" (1.702 m) 5' 7\" (1.702 m)                                              BP Readings from Last 3 Encounters:   06/10/20 115/80   06/10/20 (!) 96/57   02/24/20 127/87       NPO Status:                                                                                 BMI:   Wt Readings from Last 3 Encounters:   12/16/20 184 lb (83.5 kg)   08/15/20 181 lb (82.1 kg)   07/20/20 182 lb (82.6 kg)     Body mass index is 28.82 kg/m². CBC:   Lab Results   Component Value Date    WBC 9.1 10/19/2019    RBC 5.07 10/19/2019    HGB 15.2 10/19/2019    HCT 44.4 10/19/2019    MCV 87.6 10/19/2019    RDW 11.9 10/19/2019     10/19/2019       CMP:   Lab Results   Component Value Date     06/03/2016    K 3.9 06/03/2016     06/03/2016    CO2 22 06/03/2016    BUN 18 06/03/2016    CREATININE 0.8 06/03/2016    GFRAA >60 06/03/2016    LABGLOM >60 06/03/2016    GLUCOSE 96 08/15/2020    PROT 6.8 06/03/2016    CALCIUM 9.7 06/03/2016    BILITOT 0.2 06/03/2016    ALKPHOS 53 06/03/2016    AST 18 06/03/2016    ALT 19 06/03/2016       POC Tests: No results for input(s): POCGLU, POCNA, POCK, POCCL, POCBUN, POCHEMO, POCHCT in the last 72 hours.     Coags: No results found for: PROTIME, INR, APTT    HCG (If Applicable): No results found for: PREGTESTUR, PREGSERUM, HCG, HCGQUANT     ABGs: No results found for: PHART, PO2ART, DTW0MPY, XEV0JRA, BEART, P6WBBFPG     Type & Screen (If Applicable):  No results found for: LABABO, LABRH    Drug/Infectious Status (If Applicable):  No results found for: HIV, HEPCAB    COVID-19 Screening (If Applicable):   Lab Results   Component Value Date    COVID19 Not Detected 12/09/2020       EKG 1/20/16  Sinus  Rhythm   -  Nonspecific T-abnormality. Anesthesia Evaluation  Patient summary reviewed and Nursing notes reviewed no history of anesthetic complications:   Airway: Mallampati: II  TM distance: >3 FB   Neck ROM: full  Mouth opening: > = 3 FB Dental:      Comment: Denies missing, chipped or loose teeth. One filling in the upper back left. Pulmonary: breath sounds clear to auscultation      Smoker:  quit a few months ago. Patient did not smoke on day of surgery. Cardiovascular:  Exercise tolerance: good (>4 METS),   (+) hypertension:,       ECG reviewed  Rhythm: regular  Rate: normal                 ROS comment: Took amlodipine this morning. EF 65% on 2016       Neuro/Psych:               GI/Hepatic/Renal:             Endo/Other:                     Abdominal:           Vascular:                                      Anesthesia Plan      general     ASA 3       Induction: intravenous. MIPS: Postoperative opioids intended and Prophylactic antiemetics administered. Anesthetic plan and risks discussed with patient and spouse. Use of blood products discussed with patient whom. Plan discussed with CRNA and attending. Attending anesthesiologist reviewed and agrees with Pre Eval content          Cj Schneider RN   12/16/2020        DOS STAFF ADDENDUM:    Pt seen and examined, physical exam updated, chart reviewed including anesthesia, drug and allergy history. H&P reviewed.   No interval changes to history or physical examination (unless noted above). NPO status confirmed. Anesthetic plan, risks, benefits, alternatives discussed with patient. Patient verbalized an understanding and agrees to proceed.      Maycol Lock MD  Anesthesiologist

## 2020-12-17 NOTE — OP NOTE
[address]                  [address] [facility]  [facility]                                OPERATIVE REPORT    PATIENT NAME: Kael Vincent                 :        1970  MED REC NO:   [id number]                            ROOM:  ACCOUNT NO:   [de-identified]                           ADMIT DATE: 2020  PROVIDER:     Phoebe Bedolla MD    DATE OF PROCEDURE:  2020    PREOPERATIVE DIAGNOSES:  Symptomatic macromastia, right breast larger  than the left; axillary fullness. POSTOPERATIVE DIAGNOSES:  Symptomatic macromastia, right breast larger  than the left; axillary fullness. PROCEDURE:  Bilateral breast reduction with liposuction as part. SURGEON:  Phoebe Bedolla MD    ASSISTANT:  Dr. Edna Landers:  General.    ESTIMATED BLOOD LOSS:  150 mL. URINE OUTPUT:  700 mL. CRYSTALLOIDS GIVEN:  1700 mL. OPERATIVE PROCEDURE:  Prior to the procedure, the patient would receive  IV antibiotics and lower extremity compression devices. With the  patient sitting upright in the operating table, the midline was marked  along with the inframammary crease of each breast.  The areas of  conservative liposuction were marked as well. A pair of obstetrical  calipers was used to transpose the distance from the midclavicular point  to the inframammary crease to the anterior surface of each breast.   These new nipple sites were measured for symmetry with a segment of  umbilical tape. 7-cm divergent limbs were drawn from each new nipple  site and the umbilical tape was used to make certain that the upper  medial lateral segment of each breast was close to its corresponding  inframammary crease distance. Final inspection for symmetry was done,  noting the right breast larger than the left. The patient underwent  general anesthesia. [place]ch[place] catheter was inserted. Prepping was done  allowing 3 minutes to elapse before draping.   The surgical time-out  policy was enforced. Starting on the left smaller breast, a Prolene  suture was used to make certain again the upper medial lateral segment  was close to its corresponding inframammary crease distance. A 42-mm  nipple template was centered over the left nipple. Methylene blue in a  25-gauge needle was used to tattoo two marks at the 12 o'clock position  and one nathanael at the 3, 6, and 9 o'clock positions as well as key points  in the reduction pattern. A generous inferior pedicle was drawn. The  skin incision was carried out around the nipple and the vertical  pedicle. The inferior pedicle was then de-epithelialized. Cautery was  used to carry out the pedicle, maintaining adequate width and  hemostasis. The skin incision in the upper medial central lateral  segment was then carried out and this tissue was resected in en bloc  fashion, again, with meticulous attention to hemostasis. Some  additional resection centrally and laterally was done to ensure proper  reduction and contouring purposes. The wound was irrigated out  thoroughly with saline, inspected for bleeding. This process was  repeated three separate times. 5000 units of topical thrombin was  sprayed in the left side. A drain was left and the vertical incision  was secured to the inframammary crease with a Prolene suture. The  pedicle was tucked up underneath. Attention was then directed to the  opposite right side. Identical procedure was done including the  measuring and marking and tattooing. The de-epithelialization of the  pedicle was carried out and resection of tissue as previously described  with meticulous attention to hemostasis. It should be noted that the  right breast was larger. Temporary closure was done to ensure symmetry. The right side was opened, irrigated out thoroughly with saline,  inspected for bleeding three separate times.   The 5000 units of thrombin  was used to drain and securing the vertical incision to the inframammary  crease. Several 3-0 Vicryl's were placed on the inframammary incision  in a buried fashion. Mostly, interrupted 4-0 Vicryl buried deep dermal  sutures were placed along the inframammary incision in an interrupted  buried fashion as well as the vertical incision. 50 mL of 1% Xylocaine  with epinephrine was mixed with a liter of saline. Using the tumescent  pump, infusion in the liposuction area was done. This was set for at  least 30 minutes before doing liposuction. 42 mm nipple template was  centered over each new nipple site, measured for symmetry and the skin  and subcutaneous tissues were resected from this area. Bleeders were  electrocoagulated. Final weights: The right breast was 1053 gm and on  the left breast 799 gm. Both specimens will be sent for pathologic  evaluation. Each nipple was brought in to its new nipple site. The  left side was bleeding good on its edges but stressed more, the right  was normal.  The 6 o'clock positions were secured with a 4-0 Vicryl  buried deep dermal sutures followed by the 12 o'clock, 3 o'clock, and 9  o'clock positions as well as in between. The nipples were closed with  running subcuticular suture of 5-0 Vicryl. The vertical incision was  closed with a separate segment of 5-0 Vicryl suture in a running  subcuticular fashion. Liposuction was then done with a 3.7 mm blunt tip  cannula removing approximately 100 mL from each side. There was  immediate contour improvement. Palpation transversely, vertically, and  in both oblique directions on both sides was done to ensure symmetry and  adequate uniform liposuction results. The inframammary incision was  closed with a 4-0 Monocryl in a running fashion. The drains were  secured in place with 3-0 nylon suture. The sponge and needle count was  correct at the completion of the procedure. All areas were cleansed. Steri-Strips were applied.   Nitropaste was applied to the left nipple  with Xeroform gauze over it. A bulky dressing and a surgical bra was  applied. The patient tolerated the procedure quite well and was sent to  the recovery room.         Naresh Canas MD    D: 12/16/2020 12:14:14       T: 12/16/2020 12:22:06     [email or web address]  Job#: [id number]     Doc#: [id number]    CC:

## 2021-01-14 ENCOUNTER — OFFICE VISIT (OUTPATIENT)
Dept: PODIATRY | Age: 51
End: 2021-01-14
Payer: COMMERCIAL

## 2021-01-14 VITALS
TEMPERATURE: 98.2 F | BODY MASS INDEX: 29.91 KG/M2 | DIASTOLIC BLOOD PRESSURE: 82 MMHG | SYSTOLIC BLOOD PRESSURE: 128 MMHG | WEIGHT: 191 LBS

## 2021-01-14 DIAGNOSIS — M79.672 PAIN IN LEFT FOOT: ICD-10-CM

## 2021-01-14 DIAGNOSIS — S99.922A INJURY OF PLANTAR PLATE OF LEFT FOOT, INITIAL ENCOUNTER: Primary | ICD-10-CM

## 2021-01-14 PROCEDURE — 20600 DRAIN/INJ JOINT/BURSA W/O US: CPT | Performed by: PODIATRIST

## 2021-01-14 PROCEDURE — 99213 OFFICE O/P EST LOW 20 MIN: CPT | Performed by: PODIATRIST

## 2021-01-14 RX ORDER — BETAMETHASONE SODIUM PHOSPHATE AND BETAMETHASONE ACETATE 3; 3 MG/ML; MG/ML
6 INJECTION, SUSPENSION INTRA-ARTICULAR; INTRALESIONAL; INTRAMUSCULAR; SOFT TISSUE ONCE
Status: COMPLETED | OUTPATIENT
Start: 2021-01-14 | End: 2021-01-14

## 2021-01-14 RX ORDER — BUPIVACAINE HYDROCHLORIDE 5 MG/ML
1 INJECTION, SOLUTION PERINEURAL ONCE
Status: COMPLETED | OUTPATIENT
Start: 2021-01-14 | End: 2021-01-14

## 2021-01-14 RX ADMIN — BUPIVACAINE HYDROCHLORIDE 5 MG: 5 INJECTION, SOLUTION PERINEURAL at 10:32

## 2021-01-14 RX ADMIN — BETAMETHASONE SODIUM PHOSPHATE AND BETAMETHASONE ACETATE 6 MG: 3; 3 INJECTION, SUSPENSION INTRA-ARTICULAR; INTRALESIONAL; INTRAMUSCULAR; SOFT TISSUE at 10:31

## 2021-01-14 NOTE — PROGRESS NOTES
21  Abel Simental : 1970 Sex: female  Age: 44 y.o. Patient was referred by Tere Grove DO    Chief Complaint   Patient presents with    Foot Pain     pt was last seen in 2019 for PF left presents today for left ball of foot pain and toe pain no trauma noted did relate a kelsea horse and after that cramp her toe and ball of foot has not been the same        SUBJECTIVE patient is seen today for a new issue patient relates the ball of her second toe has been sore for about 2 months patient notices that she gets a cramp in the toe with certain shoes and certain activities including walking. No true trauma noted. Patient did relate that she was walking a lot and tried some new shoes on months ago.   HPI  Review of Systems  Const: Denies constitutional symptoms  Musculo: Denies symptoms other than stated above  Skin: Denies symptoms other than stated above       Current Outpatient Medications:     amLODIPine (NORVASC) 5 MG tablet, Take 5 mg by mouth every morning, Disp: , Rfl:     Cholecalciferol (VITAMIN D) 50 MCG ( UT) CAPS capsule, Take by mouth nightly LD 6--20, Disp: , Rfl:     atorvastatin (LIPITOR) 10 MG tablet, Take 10 mg by mouth daily , Disp: , Rfl:     docusate sodium (COLACE) 100 MG capsule, Take 1 capsule by mouth 2 times daily (Patient not taking: Reported on 2021), Disp: 50 capsule, Rfl: 0    ibuprofen (ADVIL;MOTRIN) 400 MG tablet, Take 400 mg by mouth every 6 hours as needed for Pain , Disp: , Rfl:   No Known Allergies    Past Medical History:   Diagnosis Date    Hyperlipidemia     Hypertension     Seasonal allergies     Trigger thumb of right hand     for OR 6-10-20      Past Surgical History:   Procedure Laterality Date    BREAST REDUCTION SURGERY Bilateral 2020    BILATERAL BREAST REDUCTION WITH POSSIBLE FREE NIPPLE GRAFTS, POSSIBLE LIPOSUCTION BILATERAL AXILLA performed by Tori Byers MD at [address] [place] [address]  CARPAL TUNNEL RELEASE Right 6/10/2020    RIGHT CARPAL TUNNEL RELEASE ENDOSCOPIC performed by Pat Arevalo MD at [address] Right 6/10/2020    RIGHT THUMB FINGER TRIGGER RELEASE performed by Pat Arevalo MD at [address]       Family History   Problem Relation Age of Onset    Heart Surgery Mother     Heart Attack Mother     Diabetes Mother      Social History     Socioeconomic History    Marital status:      Spouse name: Not on file    Number of children: Not on file    Years of education: Not on file    Highest education level: Not on file   Occupational History    Not on file   Social Needs    Financial resource strain: Not on file    Food insecurity     Worry: Not on file     Inability: Not on file    Transportation needs     Medical: Not on file     Non-medical: Not on file   Tobacco Use    Smoking status: Former Smoker     Packs/day: 0.25     Types: Cigarettes    Smokeless tobacco: Never Used   Substance and Sexual Activity    Alcohol use: No     Alcohol/week: 0.0 standard drinks    Drug use: No    Sexual activity: Not on file   Lifestyle    Physical activity     Days per week: Not on file     Minutes per session: Not on file    Stress: Not on file   Relationships    Social connections     Talks on phone: Not on file     Gets together: Not on file     Attends Shinto service: Not on file     Active member of club or organization: Not on file     Attends meetings of clubs or organizations: Not on file     Relationship status: Not on file    Intimate partner violence     Fear of current or ex partner: Not on file     Emotionally abused: Not on file     Physically abused: Not on file     Forced sexual activity: Not on file   Other Topics Concern    Not on file   Social History Narrative    Not on file       Vitals:    01/14/21 0930   BP: 128/82   Temp: 98.2 °F (36.8 °C)   TempSrc: Temporal Weight: 191 lb (86.6 kg)       Focused Lower Extremity Physical Exam:  Vitals:    01/14/21 0930   BP: 128/82   Temp: 98.2 °F (36.8 °C)        Foot Exam    General  General Appearance: appears stated age and healthy   Orientation: alert and oriented to person, place, and time       Left Foot/Ankle      Inspection and Palpation  Ecchymosis: none  Tenderness: metatarsals   Swelling: lesser metatarsophalangeal joints   Skin Exam: skin intact; Neurovascular  Dorsalis pedis: 3+  Posterior tibial: 3+  Saphenous nerve sensation: normal  Tibial nerve sensation: normal  Superficial peroneal nerve sensation: normal  Deep peroneal nerve sensation: normal  Sural nerve sensation: normal  Achilles reflex: 2+  Babinski reflex: 2+             Ortho Exam    Vascular: pulses  dp  pt  palapble  Capillary Refill Time:   Hair growth  Skin:    Edema:    Neurologic:      Musculoskeletal/ Orthopedic examination: There is pain with palpation to the base of the second metatarsal phalangeal joint left foot. There is pain with dorsiflexion plantarly there is pain with palpation of that second metatarsal phalangeal joint. X-rays were reviewed showing no fractures or dislocations. NAIL   Web space   Derm:         Assessment and Plan: Today I have a feeling the patient has an issue with the plantar plate possibly a tear I did give a cortisone injection into the second metatarsophalangeal joint I placed her in a cam walker for 3 weeks patient is to continue icing and resting.   Second metatarsal phalangeal joint left foot injection: Potential risks, complications, alternative treatment options and procedure prognosis were explained to the patient. Verbal and signed informed consent were obtained from the patient. An antiseptic preparation of the skin was performed with ChloraPrep 1 cc of 0.5% Marcaine plain 1 cc of Celestone Soluspan left foot no adverse reaction was observed. The puncture site was covered with an adhesive bandage and the patient was fitted with a removable metatarsal pad. Post injection instructions were given. Cam Walker  Signed informed consent was obtained from the patient. Patient applied the cam walker to the affected limb. This device fit well. Patient was given written and verbal instructions regarding this cam walker. This is medically necessary to help reduce pain and promote and expedite healing. Carito Valdes was seen today for foot pain. Diagnoses and all orders for this visit:    Injury of plantar plate of left foot, initial encounter    Pain in left foot  -     XR FOOT LEFT (MIN 3 VIEWS); Future    Other orders  -     betamethasone acetate-betamethasone sodium phosphate (CELESTONE) injection 6 mg  -     bupivacaine (MARCAINE) 0.5 % injection 5 mg        No follow-ups on file. Seen By:  Cecy Mustafa DPM      Document was created using voice recognition software. Note was reviewed, however may contain grammatical errors.

## 2021-01-28 ENCOUNTER — OFFICE VISIT (OUTPATIENT)
Dept: PODIATRY | Age: 51
End: 2021-01-28
Payer: COMMERCIAL

## 2021-01-28 VITALS — TEMPERATURE: 97.7 F | SYSTOLIC BLOOD PRESSURE: 132 MMHG | DIASTOLIC BLOOD PRESSURE: 82 MMHG

## 2021-01-28 DIAGNOSIS — M79.672 PAIN IN LEFT FOOT: ICD-10-CM

## 2021-01-28 DIAGNOSIS — S99.922A INJURY OF PLANTAR PLATE OF LEFT FOOT, INITIAL ENCOUNTER: Primary | ICD-10-CM

## 2021-01-28 PROCEDURE — 99212 OFFICE O/P EST SF 10 MIN: CPT | Performed by: PODIATRIST

## 2021-01-28 NOTE — PROGRESS NOTES
21  Lizabeth Blinks : 1970 Sex: female  Age: 44 y.o. Patient was referred by Bashir Okeefe DO    Chief Complaint   Patient presents with    Foot Injury     left foot plantar plate injury was placed into cam walker on  and given inj    Foot Pain       SUBJECTIVE patient is seen for follow-up of left foot injury. Patient has been in a cam walker and had injection today she is pain-free.   HPI  Review of Systems  Const: Denies constitutional symptoms  Musculo: Denies symptoms other than stated above  Skin: Denies symptoms other than stated above       Current Outpatient Medications:     docusate sodium (COLACE) 100 MG capsule, Take 1 capsule by mouth 2 times daily, Disp: 50 capsule, Rfl: 0    amLODIPine (NORVASC) 5 MG tablet, Take 5 mg by mouth every morning, Disp: , Rfl:     Cholecalciferol (VITAMIN D) 50 MCG (2000) CAPS capsule, Take by mouth nightly LD 20, Disp: , Rfl:     ibuprofen (ADVIL;MOTRIN) 400 MG tablet, Take 400 mg by mouth every 6 hours as needed for Pain LD 20, Disp: , Rfl:     atorvastatin (LIPITOR) 10 MG tablet, Take 10 mg by mouth daily , Disp: , Rfl:   No Known Allergies    Past Medical History:   Diagnosis Date    Hyperlipidemia     Hypertension     Seasonal allergies     Trigger thumb of right hand     for OR 6-10-20      Past Surgical History:   Procedure Laterality Date    BREAST REDUCTION SURGERY Bilateral 2020    BILATERAL BREAST REDUCTION WITH POSSIBLE FREE NIPPLE GRAFTS, POSSIBLE LIPOSUCTION BILATERAL AXILLA performed by Kaylynn Crooks MD at [facility] Right 6/10/2020    RIGHT CARPAL TUNNEL RELEASE ENDOSCOPIC performed by Salazar Mosley MD at [address] [place] [address] Right 6/10/2020    RIGHT THUMB FINGER TRIGGER RELEASE [facility] OR    TUBAL LIGATION       Family History   Problem Relation Age of Onset    Heart Surgery Mother  Heart Attack Mother     Diabetes Mother      Social History     Socioeconomic History    Marital status:      Spouse name: Not on file    Number of children: Not on file    Years of education: Not on file    Highest education level: Not on file   Occupational History    Not on file   Social Needs    Financial resource strain: Not on file    Food insecurity     Worry: Not on file     Inability: Not on file    Transportation needs     Medical: Not on file     Non-medical: Not on file   Tobacco Use    Smoking status: Former Smoker     Packs/day: 0.25     Types: Cigarettes    Smokeless tobacco: Never Used   Substance and Sexual Activity    Alcohol use: No     Alcohol/week: 0.0 standard drinks    Drug use: No    Sexual activity: Not on file   Lifestyle    Physical activity     Days per week: Not on file     Minutes per session: Not on file    Stress: Not on file   Relationships    Social connections     Talks on phone: Not on file     Gets together: Not on file     Attends Zoroastrian service: Not on file     Active member of club or organization: Not on file     Attends meetings of clubs or organizations: Not on file     Relationship status: Not on file    Intimate partner violence     Fear of current or ex partner: Not on file     Emotionally abused: Not on file     Physically abused: Not on file     Forced sexual activity: Not on file   Other Topics Concern    Not on file   Social History Narrative    Not on file       Vitals:    01/28/21 1021   BP: 132/82   Temp: 97.7 °F (36.5 °C)   TempSrc: Temporal   Weight: Comment: cam walker       Focused Lower Extremity Physical Exam:  Vitals:    01/28/21 1021   BP: 132/82   Temp: 97.7 °F (36.5 °C)        Foot Exam    General  General Appearance: appears stated age and healthy   Orientation: alert and oriented to person, place, and time       Right Foot/Ankle     Inspection and Palpation  Ecchymosis: none  Tenderness: none   Swelling: none Left Foot/Ankle      Inspection and Palpation  Ecchymosis: none  Tenderness: none   Swelling: none   Skin Exam: skin intact; Neurovascular  Dorsalis pedis: 3+  Posterior tibial: 3+  Saphenous nerve sensation: normal  Tibial nerve sensation: normal  Superficial peroneal nerve sensation: normal  Deep peroneal nerve sensation: normal  Sural nerve sensation: normal  Achilles reflex: 2+  Babinski reflex: 2+             Ortho Exam    Vascular: pulses  dp  pt  palapble  Capillary Refill Time:   Hair growth  Skin:    Edema:    Neurologic:      Musculoskeletal/ Orthopedic examination: There is neg  pain with palpation to the base of the second metatarsal phalangeal joint left foot. There is neg  pain with dorsiflexion plantarly there is neg  pain with palpation of that second metatarsal phalangeal joint. X-rays were reviewed showing no fractures or dislocations. NAIL   Web space   Derm:         Assessment and Plan: Patient is feeling much better she is gradually to get out of the Cam walker at this time and transition into a tennis shoe patient is to reappoint if any inflammation she did asked me to take a look at her ingrown toenail which was not bothering her she is to reappoint as needed for matrixectomy right hallux lateral border      Tere Baker was seen today for foot injury and foot pain. Diagnoses and all orders for this visit:    Injury of plantar plate of left foot, initial encounter    Pain in left foot        Return if symptoms worsen or fail to improve. Seen By:  Jamar Almonte DPM      Document was created using voice recognition software. Note was reviewed, however may contain grammatical errors.

## 2021-04-05 ENCOUNTER — OFFICE VISIT (OUTPATIENT)
Dept: PRIMARY CARE CLINIC | Age: 51
End: 2021-04-05
Payer: COMMERCIAL

## 2021-04-05 VITALS
SYSTOLIC BLOOD PRESSURE: 112 MMHG | WEIGHT: 189 LBS | DIASTOLIC BLOOD PRESSURE: 82 MMHG | BODY MASS INDEX: 29.6 KG/M2 | HEART RATE: 89 BPM | OXYGEN SATURATION: 95 % | TEMPERATURE: 97.2 F

## 2021-04-05 DIAGNOSIS — E78.5 DYSLIPIDEMIA: ICD-10-CM

## 2021-04-05 DIAGNOSIS — I10 ESSENTIAL HYPERTENSION: Primary | ICD-10-CM

## 2021-04-05 DIAGNOSIS — Z12.11 COLON CANCER SCREENING: ICD-10-CM

## 2021-04-05 PROCEDURE — 99203 OFFICE O/P NEW LOW 30 MIN: CPT | Performed by: FAMILY MEDICINE

## 2021-04-05 SDOH — ECONOMIC STABILITY: TRANSPORTATION INSECURITY
IN THE PAST 12 MONTHS, HAS LACK OF TRANSPORTATION KEPT YOU FROM MEETINGS, WORK, OR FROM GETTING THINGS NEEDED FOR DAILY LIVING?: NOT ASKED

## 2021-04-05 SDOH — ECONOMIC STABILITY: FOOD INSECURITY: WITHIN THE PAST 12 MONTHS, YOU WORRIED THAT YOUR FOOD WOULD RUN OUT BEFORE YOU GOT MONEY TO BUY MORE.: NOT ASKED

## 2021-04-05 SDOH — ECONOMIC STABILITY: INCOME INSECURITY: HOW HARD IS IT FOR YOU TO PAY FOR THE VERY BASICS LIKE FOOD, HOUSING, MEDICAL CARE, AND HEATING?: NOT ASKED

## 2021-04-05 SDOH — ECONOMIC STABILITY: TRANSPORTATION INSECURITY
IN THE PAST 12 MONTHS, HAS THE LACK OF TRANSPORTATION KEPT YOU FROM MEDICAL APPOINTMENTS OR FROM GETTING MEDICATIONS?: NOT ASKED

## 2021-04-05 ASSESSMENT — ENCOUNTER SYMPTOMS
DIARRHEA: 0
CONSTIPATION: 0
SHORTNESS OF BREATH: 0
BLOOD IN STOOL: 0

## 2021-04-05 ASSESSMENT — PATIENT HEALTH QUESTIONNAIRE - PHQ9
2. FEELING DOWN, DEPRESSED OR HOPELESS: 0
SUM OF ALL RESPONSES TO PHQ9 QUESTIONS 1 & 2: 0
SUM OF ALL RESPONSES TO PHQ QUESTIONS 1-9: 0
SUM OF ALL RESPONSES TO PHQ QUESTIONS 1-9: 0

## 2021-04-05 NOTE — PROGRESS NOTES
Familia Oconnell, a female of 44 y.o. came to the office 4/5/2021. Patient Active Problem List   Diagnosis    Chest pain    Essential hypertension    Dyslipidemia    Non morbid obesity due to excess calories    Vitamin D deficiency    Plantar fasciitis    Pain in right foot    Calcaneal spur of foot, right          Hypertension  This is a chronic problem. The problem is controlled. Pertinent negatives include no chest pain, headaches, palpitations, peripheral edema or shortness of breath. There are no compliance problems. Hyperlipidemia  This is a chronic problem. The current episode started more than 1 year ago. The problem is uncontrolled. Pertinent negatives include no chest pain, leg pain, myalgias or shortness of breath. Current antihyperlipidemic treatment includes statins. There are no compliance problems. No Known Allergies    Current Outpatient Medications on File Prior to Visit   Medication Sig Dispense Refill    amLODIPine (NORVASC) 5 MG tablet Take 5 mg by mouth every morning      Cholecalciferol (VITAMIN D) 50 MCG (2000 UT) CAPS capsule Take by mouth nightly LD 6-7-20      atorvastatin (LIPITOR) 10 MG tablet Take 10 mg by mouth daily       ibuprofen (ADVIL;MOTRIN) 400 MG tablet Take 400 mg by mouth every 6 hours as needed for Pain LD 6-7-20       No current facility-administered medications on file prior to visit. Review of Systems   Respiratory: Negative for shortness of breath. Cardiovascular: Negative for chest pain, palpitations and leg swelling. Gastrointestinal: Negative for blood in stool, constipation and diarrhea. Musculoskeletal: Negative for myalgias. Neurological: Negative for headaches. other review of systems reviewed and are negative    OBJECTIVE:  /82   Pulse 89   Temp 97.2 °F (36.2 °C)   Wt 189 lb (85.7 kg)   LMP 05/08/2018   SpO2 95%   BMI 29.60 kg/m²      Physical Exam  Vitals signs reviewed.    Eyes:      General: No scleral

## 2021-05-05 ENCOUNTER — OFFICE VISIT (OUTPATIENT)
Dept: SURGERY | Age: 51
End: 2021-05-05
Payer: COMMERCIAL

## 2021-05-05 VITALS
WEIGHT: 187.6 LBS | HEART RATE: 85 BPM | SYSTOLIC BLOOD PRESSURE: 120 MMHG | OXYGEN SATURATION: 98 % | DIASTOLIC BLOOD PRESSURE: 80 MMHG | RESPIRATION RATE: 16 BRPM | TEMPERATURE: 98 F | BODY MASS INDEX: 29.44 KG/M2 | HEIGHT: 67 IN

## 2021-05-05 DIAGNOSIS — Z12.11 ENCOUNTER FOR SCREENING COLONOSCOPY: Primary | ICD-10-CM

## 2021-05-05 PROCEDURE — 99243 OFF/OP CNSLTJ NEW/EST LOW 30: CPT | Performed by: SURGERY

## 2021-05-05 NOTE — PATIENT INSTRUCTIONS
[facility] COLONOSCOPY PREPARATION    Instructions for Clear liquid diet  Definition  A clear liquid diet consists of clear liquids, such as water, broth and plain gelatin, that are easily digested and leave no undigested residue in your intestinal tract. Your doctor may prescribe a clear liquid diet before certain medical procedures or if you have certain digestive problems. Because a clear liquid diet can't provide you with adequate calories and nutrients, it shouldn't be continued for more than a few days. Purpose  A clear liquid diet is often used before tests, procedures or surgeries that require no food in your stomach or intestines, such as before colonoscopy. It may also be recommended as a short-term diet if you have certain digestive problems, such as nausea, vomiting or diarrhea, or after certain types of surgery. Diet details  A clear liquid diet helps maintain adequate hydration, provides some important electrolytes, such as sodium and potassium, and gives some energy at a time when a full diet isn't possible or recommended. The following foods are allowed in a clear liquid diet:    Plain water    Fruit juices without pulp, such as apple juice, white grape juice.  Strained lemonade    Clear, fat-free broth (bouillon or consomme)    Clear sodas     Plain gelatin (Not RED or PURPLE)   Honey    Ice pops without bits of fruit or fruit pulp    Tea or coffee without milk or cream   ** NOTHING RED OR PURPLE    Any foods not on the above list should be avoided. Also, for certain tests, such as colon exams, your doctor may ask you to avoid liquids or gelatin with red coloring.      A typical menu on the clear liquid diet may look like this:   Breakfast:  1 glass fruit juice  1 cup coffee or tea (without dairy products)  1 cup broth  1 bowl gelatin     Snack:  1 glass fruit juice  1 bowl gelatin     Lunch:  1 glass fruit juice  1 glass water  1 cup broth  1 bowl gelatin     Snack:  1 ice pop (without fruit pulp)  1 cup coffee or tea (without dairy products) or a soft drink     Dinner:  1 cup juice or water  1 cup broth  1 bowl gelatin  1 cup coffee or tea     Purchase this over the counter:  1. GATORADE/Clear liquid (64 ounces)   Pick these up at the pharmacy:   [address] [place] [address] 238 grams (over the counter only)    The DAY BEFORE your colonoscopy:   Drink only clear liquids. (Absolutely no solid food)    COLONOSCOPY PREP:  3 PM: Mix the entire bottle of MIRALAX into the 64 ounces of GATORADE. (Put half the bottle in each 32 ounce bottle). Shake the solution until fully dissolved. Drink an 8 ounce glass every 30 minutes until the solution is gone. **DO NOT EAT OR DRINK ANYTHING AFTER MIDNIGHT**          The DAY OF your colonoscopy: You may take any necessary medications with a sip of water. Bring along someone to take you home. REMEMBER: The preparation is very important. An adequate clean out allows for the best evaluation of your entire colon. During the prep, using baby wipes may ease some of your discomfort.     You should NOT plan on working or driving the rest of the day due to sedation given at the procedure

## 2021-05-06 ENCOUNTER — TELEPHONE (OUTPATIENT)
Dept: SURGERY | Age: 51
End: 2021-05-06

## 2021-05-06 RX ORDER — SODIUM CHLORIDE 9 MG/ML
INJECTION, SOLUTION INTRAVENOUS CONTINUOUS
Status: CANCELLED | OUTPATIENT
Start: 2021-05-06

## 2021-05-06 ASSESSMENT — ENCOUNTER SYMPTOMS
COUGH: 0
NAUSEA: 0
VOMITING: 0
CONSTIPATION: 0
SORE THROAT: 0
WHEEZING: 0
DIARRHEA: 0
BLOOD IN STOOL: 0
BACK PAIN: 0
EYE REDNESS: 0
PHOTOPHOBIA: 0
SHORTNESS OF BREATH: 0
ABDOMINAL PAIN: 0

## 2021-05-06 NOTE — TELEPHONE ENCOUNTER
MA called pt and gave date/time for procedure with . Pt expressed verbal understanding.   Electronically signed by Ben Reinoso MA on 5/6/21 at 11:56 AM EDT

## 2021-05-06 NOTE — H&P
HISTORY OF PRESENT ILLNESS:    The patient is a 44 y.o. female who presents with need for colonoscopy. She has never had previous colonoscopy. She denies any family history of colon cancer. She denies any diarrhea, constipation, or rectal bleeding. Past Medical History:   Diagnosis Date    Hyperlipidemia     Hypertension     Seasonal allergies     Trigger thumb of right hand     for OR 6-10-20        Past Surgical History:   Procedure Laterality Date    BREAST REDUCTION SURGERY Bilateral 12/16/2020    BILATERAL BREAST REDUCTION WITH POSSIBLE FREE NIPPLE GRAFTS, POSSIBLE LIPOSUCTION BILATERAL AXILLA performed by Dirk Oro MD at [facility] Right 06/10/2020    RIGHT CARPAL TUNNEL RELEASE ENDOSCOPIC performed by Letty Francisco MD at [address] [place] [address] Right 06/10/2020    RIGHT THUMB FINGER TRIGGER RELEASE performed by Letty Francisco MD at [address] [place] [address]         Prior to Admission medications    Medication Sig Start Date End Date Taking?  Authorizing Provider   amLODIPine (NORVASC) 5 MG tablet Take 5 mg by mouth every morning   Yes Historical Provider, MD   Cholecalciferol (VITAMIN D) 50 MCG (2000 UT) CAPS capsule Take by mouth nightly LD 6-7-20   Yes Historical Provider, MD   atorvastatin (LIPITOR) 10 MG tablet Take 10 mg by mouth daily  2/6/20  Yes Historical Provider, MD       Scheduled Meds:  ContinuousInfusions:  PRN Meds:    No Known Allergies    Social History     Socioeconomic History    Marital status:      Spouse name: Not on file    Number of children: 3    Years of education: Not on file    Highest education level: Not on file   Occupational History    Occupation: pharmacy tech   Social Needs    Financial resource strain: Not on file    Food insecurity     Worry: Not on file     Inability: Not on file    Transportation needs     Medical: Not on file     Non-medical: Not on file   Tobacco Use  Smoking status: Former Smoker     Packs/day: 0.50     Years: 15.00     Pack years: 7.50     Types: Cigarettes     Quit date: 2020     Years since quittin.4    Smokeless tobacco: Never Used   Substance and Sexual Activity    Alcohol use: No     Alcohol/week: 0.0 standard drinks    Drug use: No    Sexual activity: Yes     Partners: Male   Lifestyle    Physical activity     Days per week: Not on file     Minutes per session: Not on file    Stress: Not on file   Relationships    Social connections     Talks on phone: Not on file     Gets together: Not on file     Attends Bahai service: Not on file     Active member of club or organization: Not on file     Attends meetings of clubs or organizations: Not on file     Relationship status: Not on file    Intimate partner violence     Fear of current or ex partner: Not on file     Emotionally abused: Not on file     Physically abused: Not on file     Forced sexual activity: Not on file   Other Topics Concern    Not on file   Social History Narrative    Not on file       Family History   Problem Relation Age of Onset    Heart Surgery Mother     Heart Attack Mother 72    Diabetes Mother     Other Father         adrenal failure due to long term steroid use       Review Of Systems:   Review of Systems   Constitutional: Negative for chills and fever. HENT: Negative for ear pain, nosebleeds, sore throat and tinnitus. Eyes: Negative for photophobia and redness. Respiratory: Negative for cough, shortness of breath and wheezing. Cardiovascular: Negative for chest pain and palpitations. Gastrointestinal: Negative for abdominal pain, blood in stool, constipation, diarrhea, nausea and vomiting. Endocrine: Negative for polydipsia. Genitourinary: Negative for dysuria, hematuria and urgency. Musculoskeletal: Negative for back pain and neck pain. Skin: Negative for rash. Neurological: Negative for dizziness, tremors and seizures. Hematological: Does not bruise/bleed easily. All other systems reviewed and are negative. Physical Exam:  Vitals:    05/05/21 1450   BP: 120/80   Site: Left Upper Arm   Position: Sitting   Cuff Size: Medium Adult   Pulse: 85   Resp: 16   Temp: 98 °F (36.7 °C)   TempSrc: Temporal   SpO2: 98%   Weight: 187 lb 9.6 oz (85.1 kg)   Height: 5' 7\" (1.702 m)       General: Well nourished, well developed, no acute distress  Eyes:  PERRL   Conjunctiva unremarkable   ENT:  TM's intact bilaterally, no effusion   Nasal:  No mucosal edema     No nasal drainage   Oral:  mucosa moist and pink   Neck:  Supple   No palpable cervical lymphoadenopathy   Thyroid without mass or enlargement  Resp: Lungs CTAB   Equal and adequate air exchange without accessory muscle use   No rales, rhonchi or wheeze  CV: S1S2 RRR   No murmur   Intact distal pulses   No edema  GI: Abdomen Soft, non tender, non distended   Normoactive bowel sounds   No palpable hepatosplenomegaly  MS: Physiologic ROM of all extremities    Intact distal pulses   No clubbing or cyanosis   Skin Warm and dry; no rash or lesion  Neuro: Alert and oriented; normal and intact dtr's   Psych: Euthymic mood, congruent affect      No results found. Assessment/Plan:  3 53-year-old female who presents for screening colonoscopy. We will schedule this in the near future. The risks and benefits of the procedure were explained to the patient, including risks of bleeding and perforation. The patient expressed understanding of these risks.

## 2021-05-06 NOTE — TELEPHONE ENCOUNTER
Prior Authorization Form:      DEMOGRAPHICS:                     Patient Name:  Benjamín Berger  Patient :  1970            Insurance:  Payor: [employer] / Plan: [employer]  EMP / Product Type: *No Product type* /   Insurance ID Number:    Payor/Plan Subscr  Sex Relation Sub. Ins. ID Effective Group Num   1.  [address] 1970 Female Self [id number]                                   [address]         DIAGNOSIS & PROCEDURE:                       Procedure/Operation: colonoscopy           CPT Code: 08326    Diagnosis:  screening    ICD10 Code: z12.11    Location:  [facility]    Surgeon:  Cleo Cobos INFORMATION:                          Date: 6/10    Time: 1200p              Anesthesia:  MAC/TIVA                                                       Status:  Outpatient        Special Comments:         Electronically signed by Keith Mahoney on 2021 at 9:12 AM

## 2021-05-06 NOTE — PROGRESS NOTES
Consult Note    Dear Regan Louis, thank you for referring Lisa Breeding for evaluation. Reason for Consult: Colonoscopy    HISTORY OF PRESENT ILLNESS:    The patient is a 44 y.o. female who presents with need for colonoscopy. She has never had previous colonoscopy. She denies any family history of colon cancer. She denies any diarrhea, constipation, or rectal bleeding. Past Medical History:   Diagnosis Date    Hyperlipidemia     Hypertension     Seasonal allergies     Trigger thumb of right hand     for OR 6-10-20        Past Surgical History:   Procedure Laterality Date    BREAST REDUCTION SURGERY Bilateral 12/16/2020    BILATERAL BREAST REDUCTION WITH POSSIBLE FREE NIPPLE GRAFTS, POSSIBLE LIPOSUCTION BILATERAL AXILLA performed by Aren Stein MD at [address] [place] [address] Right 06/10/2020    RIGHT CARPAL TUNNEL RELEASE ENDOSCOPIC performed by Jessica Abdi MD at [address] [place] [address] Right 06/10/2020    RIGHT THUMB FINGER TRIGGER RELEASE performed by Jessica Abdi MD at [address] [place] [address]         Prior to Admission medications    Medication Sig Start Date End Date Taking?  Authorizing Provider   amLODIPine (NORVASC) 5 MG tablet Take 5 mg by mouth every morning   Yes Historical Provider, MD   Cholecalciferol (VITAMIN D) 50 MCG (2000 UT) CAPS capsule Take by mouth nightly LD 6-7-20   Yes Historical Provider, MD   atorvastatin (LIPITOR) 10 MG tablet Take 10 mg by mouth daily  2/6/20  Yes Historical Provider, MD       Scheduled Meds:  ContinuousInfusions:  PRN Meds:    No Known Allergies    Social History     Socioeconomic History    Marital status:      Spouse name: Not on file    Number of children: 3    Years of education: Not on file    Highest education level: Not on file   Occupational History    Occupation: pharmacy tech   Social Needs    Financial resource strain: Not on file   Olu-Vanessa

## 2021-06-08 RX ORDER — M-VIT,TX,IRON,MINS/CALC/FOLIC 27MG-0.4MG
1 TABLET ORAL DAILY
COMMUNITY

## 2021-06-08 NOTE — PROGRESS NOTES
[facility] PRE-ADMISSION TESTING INSTRUCTIONS    Please come to the hospital wearing a mask and have your significant other wear a mask as well. Both of you should check your temperature before leaving to come here,  if it is 100 or higher please call [phone], preop area, opens 0530 a.m.,  for instruction. The Preadmission Testing patient is instructed accordingly using the following criteria (check applicable):    ARRIVAL INSTRUCTIONS:  [x] Parking the day of Surgery is located in the Main Entrance lot. Upon entering the door, make an immediate right to the surgery reception desk    [x] Bring photo ID and insurance card    [] Bring in a copy of Living will or Durable Power of  papers.     [x] Please be sure to arrange for responsible adult to provide transportation to and from the hospital    [x] Please arrange for responsible adult to be with you for the 24 hour period post procedure due to having anesthesia      GENERAL INSTRUCTIONS:    [x] Nothing by mouth after midnight, including gum, candy, mints or water    [x] You may brush your teeth, but do not swallow any water    [x] Take medications as instructed with 1-2 oz of water    [x] Stop herbal supplements and vitamins 5 days prior to procedure    [x] Follow preop dosing of blood thinners per physician instructions    [] Take 1/2 dose of evening insulin, but no insulin after midnight    [] No oral diabetic medications after midnight    [] If diabetic and have low blood sugar or feel symptomatic, take 1-2oz apple juice only    [] Bring inhalers day of surgery    [] Bring C-PAP/ Bi-Pap day of surgery    [] Bring urine specimen day of surgery    [] Shower or bath with soap, lather and rinse well, AM of Surgery, no lotion, powders or creams to surgical site    [x] Follow bowel prep as instructed per surgeon    [x] No tobacco products within 24 hours of surgery     [x] No alcohol or illegal drug use within 24 hours of surgery.     [x] Jewelry, body piercing's, eyeglasses, contact lenses and dentures are not permitted into surgery (bring cases)      [x] Please do not wear any nail polish, make up or hair products on the day of surgery    [x] You can expect a call the business day prior to procedure to notify you if your arrival time changes    [x] If you receive a survey after surgery we would greatly appreciate your comments    [] Parent/guardian of a minor must accompany their child and remain on the premises  the entire time they are under our care     [] Pediatric patients may bring favorite toy, blanket or comfort item with them    [] A caregiver or family member must remain with the patient during their stay if they are mentally handicapped, have dementia, disoriented or unable to use a call light or would be a safety concern if left unattended    [x] Please notify surgeon if you develop any illness between now and time of surgery (cold, cough, sore throat, fever, nausea, vomiting) or any signs of infections  including skin, wounds, and dental.    [x]  The Outpatient Pharmacy is available to fill your prescription here on your day of surgery, ask your preop nurse for details    [] Other instructions    EDUCATIONAL MATERIALS PROVIDED:    [] PAT Preoperative Education Packet/Booklet     [] Medication List    [] Transfusion bracelet applied with instructions    [] Shower with soap, lather and rinse well, and use CHG wipes provided the evening before surgery as instructed    [] Incentive spirometer with instructions

## 2021-06-08 NOTE — PROGRESS NOTES
Have you been tested for COVID  No    vaccinated       Have you been told you were positive for COVID No  Have you had any known exposure to someone that is positive for COVID No  Do you have a cough                   No              Do you have shortness of breath No                 Do you have a sore throat            No                Are you having chills                    No                Are you having muscle aches. No                    Please come to the hospital wearing a mask and have your significant other wear a mask as well. Both of you should check your temperature before leaving to come here,  if it is 100 or higher please call [phone] for instruction.

## 2021-06-10 ENCOUNTER — ANESTHESIA (OUTPATIENT)
Dept: ENDOSCOPY | Age: 51
End: 2021-06-10
Payer: COMMERCIAL

## 2021-06-10 ENCOUNTER — HOSPITAL ENCOUNTER (OUTPATIENT)
Age: 51
Setting detail: OUTPATIENT SURGERY
Discharge: HOME | End: 2021-06-10
Attending: SURGERY | Admitting: SURGERY
Payer: COMMERCIAL

## 2021-06-10 ENCOUNTER — ANESTHESIA EVENT (OUTPATIENT)
Dept: ENDOSCOPY | Age: 51
End: 2021-06-10
Payer: COMMERCIAL

## 2021-06-10 VITALS
OXYGEN SATURATION: 100 % | SYSTOLIC BLOOD PRESSURE: 135 MMHG | DIASTOLIC BLOOD PRESSURE: 82 MMHG | RESPIRATION RATE: 24 BRPM

## 2021-06-10 VITALS
WEIGHT: 180 LBS | BODY MASS INDEX: 28.25 KG/M2 | TEMPERATURE: 97.5 F | SYSTOLIC BLOOD PRESSURE: 120 MMHG | HEIGHT: 67 IN | HEART RATE: 68 BPM | RESPIRATION RATE: 18 BRPM | DIASTOLIC BLOOD PRESSURE: 78 MMHG | OXYGEN SATURATION: 97 %

## 2021-06-10 PROCEDURE — 7100000011 HC PHASE II RECOVERY - ADDTL 15 MIN: Performed by: SURGERY

## 2021-06-10 PROCEDURE — 3609010300 HC COLONOSCOPY W/BIOPSY SINGLE/MULTIPLE: Performed by: SURGERY

## 2021-06-10 PROCEDURE — 2500000003 HC RX 250 WO HCPCS

## 2021-06-10 PROCEDURE — 3700000001 HC ADD 15 MINUTES (ANESTHESIA): Performed by: SURGERY

## 2021-06-10 PROCEDURE — 3700000000 HC ANESTHESIA ATTENDED CARE: Performed by: SURGERY

## 2021-06-10 PROCEDURE — 45380 COLONOSCOPY AND BIOPSY: CPT | Performed by: SURGERY

## 2021-06-10 PROCEDURE — 88305 TISSUE EXAM BY PATHOLOGIST: CPT

## 2021-06-10 PROCEDURE — 7100000010 HC PHASE II RECOVERY - FIRST 15 MIN: Performed by: SURGERY

## 2021-06-10 PROCEDURE — 2580000003 HC RX 258: Performed by: SURGERY

## 2021-06-10 PROCEDURE — 6360000002 HC RX W HCPCS

## 2021-06-10 PROCEDURE — 2709999900 HC NON-CHARGEABLE SUPPLY: Performed by: SURGERY

## 2021-06-10 RX ORDER — LIDOCAINE HYDROCHLORIDE 20 MG/ML
INJECTION, SOLUTION EPIDURAL; INFILTRATION; INTRACAUDAL; PERINEURAL PRN
Status: DISCONTINUED | OUTPATIENT
Start: 2021-06-10 | End: 2021-06-10 | Stop reason: SDUPTHER

## 2021-06-10 RX ORDER — SODIUM CHLORIDE 9 MG/ML
INJECTION, SOLUTION INTRAVENOUS CONTINUOUS
Status: DISCONTINUED | OUTPATIENT
Start: 2021-06-10 | End: 2021-06-10 | Stop reason: HOSPADM

## 2021-06-10 RX ORDER — LIDOCAINE HYDROCHLORIDE 20 MG/ML
INJECTION, SOLUTION EPIDURAL; INFILTRATION; INTRACAUDAL; PERINEURAL PRN
Status: DISCONTINUED | OUTPATIENT
Start: 2021-06-10 | End: 2021-06-10

## 2021-06-10 RX ORDER — PROPOFOL 10 MG/ML
INJECTION, EMULSION INTRAVENOUS PRN
Status: DISCONTINUED | OUTPATIENT
Start: 2021-06-10 | End: 2021-06-10 | Stop reason: SDUPTHER

## 2021-06-10 RX ADMIN — SODIUM CHLORIDE: 9 INJECTION, SOLUTION INTRAVENOUS at 09:41

## 2021-06-10 RX ADMIN — LIDOCAINE HYDROCHLORIDE 40 MG: 20 INJECTION, SOLUTION EPIDURAL; INFILTRATION; INTRACAUDAL; PERINEURAL at 09:45

## 2021-06-10 RX ADMIN — PROPOFOL 250 MG: 10 INJECTION, EMULSION INTRAVENOUS at 09:45

## 2021-06-10 ASSESSMENT — PAIN DESCRIPTION - PAIN TYPE
TYPE: SURGICAL PAIN
TYPE: SURGICAL PAIN

## 2021-06-10 ASSESSMENT — PAIN - FUNCTIONAL ASSESSMENT: PAIN_FUNCTIONAL_ASSESSMENT: 0-10

## 2021-06-10 ASSESSMENT — PAIN SCALES - GENERAL
PAINLEVEL_OUTOF10: 0
PAINLEVEL_OUTOF10: 0

## 2021-06-10 NOTE — ANESTHESIA PRE PROCEDURE
RIGHT CARPAL TUNNEL RELEASE ENDOSCOPIC performed by Yosef Kerr MD at [address] [facility] [place] Right 06/10/2020    RIGHT THUMB FINGER TRIGGER RELEASE performed by Yosef Kerr MD at [facility]         Social History:    Social History     Tobacco Use    Smoking status: Former Smoker     Packs/day: 0.50     Years: 15.00     Pack years: 7.50     Types: Cigarettes     Quit date: 2020     Years since quittin.5    Smokeless tobacco: Never Used   Substance Use Topics    Alcohol use: Yes     Alcohol/week: 0.0 standard drinks     Comment: socially                                Counseling given: Not Answered      Vital Signs (Current): There were no vitals filed for this visit. BP Readings from Last 3 Encounters:   21 120/80   21 112/82   21 132/82       NPO Status:                                                                                 BMI:   Wt Readings from Last 3 Encounters:   21 180 lb (81.6 kg)   21 187 lb 9.6 oz (85.1 kg)   21 189 lb (85.7 kg)     There is no height or weight on file to calculate BMI.    CBC:   Lab Results   Component Value Date    WBC 9.1 10/19/2019    RBC 5.07 10/19/2019    HGB 15.2 10/19/2019    HCT 44.4 10/19/2019    MCV 87.6 10/19/2019    RDW 11.9 10/19/2019     10/19/2019       CMP:   Lab Results   Component Value Date     2016    K 3.9 2016     2016    CO2 22 2016    BUN 18 2016    CREATININE 0.8 2016    GFRAA >60 2016    LABGLOM >60 2016    GLUCOSE 96 08/15/2020    PROT 6.8 2016    CALCIUM 9.7 2016    BILITOT 0.2 2016    ALKPHOS 53 2016    AST 18 2016    ALT 19 2016       POC Tests: No results for input(s): POCGLU, POCNA, POCK, POCCL, POCBUN, POCHEMO, POCHCT in the last 72 hours.     Coags: No results found for: PROTIME, INR, APTT HCG (If Applicable): No results found for: PREGTESTUR, PREGSERUM, HCG, HCGQUANT     ABGs: No results found for: PHART, PO2ART, YVT0KPB, DEB3LJI, BEART, [id number]     Type & Screen (If Applicable):  No results found for: LABABO, LABRH    Drug/Infectious Status (If Applicable):  No results found for: HIV, HEPCAB    COVID-19 Screening (If Applicable):   Lab Results   Component Value Date    COVID19 Not Detected 12/09/2020       EKG 1/20/16  Sinus  Rhythm   -  Nonspecific T-abnormality. Anesthesia Evaluation  Patient summary reviewed and Nursing notes reviewed no history of anesthetic complications:   Airway: Mallampati: II  TM distance: >3 FB   Neck ROM: full  Mouth opening: > = 3 FB Dental:      Comment: Denies missing, chipped or loose teeth. One filling in the upper back left. Pulmonary: breath sounds clear to auscultation      Smoker:  quit a few months ago. Patient did not smoke on day of surgery. ROS comment: Former smoker   Cardiovascular:  Exercise tolerance: good (>4 METS),   (+) hypertension: moderate, hyperlipidemia    (-) past MI and CAD    ECG reviewed  Rhythm: regular  Rate: normal                 ROS comment: Took amlodipine this morning. EF 65% on 2016       Neuro/Psych:   Negative Neuro/Psych ROS              GI/Hepatic/Renal:        (-) liver disease and no renal disease       Endo/Other:        (-) diabetes mellitus, hypothyroidism               Abdominal:           Vascular: negative vascular ROS. Anesthesia Plan      MAC     ASA 2       Induction: intravenous. MIPS: Prophylactic antiemetics administered. Anesthetic plan and risks discussed with patient. Use of blood products discussed with patient whom. Plan discussed with CRNA.                   Geeta Walters MD   6/10/2021

## 2021-06-10 NOTE — H&P
HISTORY OF PRESENT ILLNESS:    The patient is a 44 y.o. female who presents with need for colonoscopy. She has never had previous colonoscopy. She denies any family history of colon cancer. She denies any diarrhea, constipation, or rectal bleeding. Past Medical History        Past Medical History:   Diagnosis Date    Hyperlipidemia      Hypertension      Seasonal allergies      Trigger thumb of right hand       for OR 6-10-20             Past Surgical History         Past Surgical History:   Procedure Laterality Date    BREAST REDUCTION SURGERY Bilateral 12/16/2020     BILATERAL BREAST REDUCTION WITH POSSIBLE FREE NIPPLE GRAFTS, POSSIBLE LIPOSUCTION BILATERAL AXILLA performed by Lazaro Denver, MD at [address] Right 06/10/2020     RIGHT CARPAL TUNNEL RELEASE ENDOSCOPIC performed by Abe Palma MD at [address] Right 06/10/2020     RIGHT THUMB FINGER TRIGGER RELEASE performed by Abe Palma MD at [address] Medications           Prior to Admission medications    Medication Sig Start Date End Date Taking?  Authorizing Provider   amLODIPine (NORVASC) 5 MG tablet Take 5 mg by mouth every morning     Yes Historical Provider, MD   Cholecalciferol (VITAMIN D) 50 MCG (2000 UT) CAPS capsule Take by mouth nightly LD 6-7-20     Yes Historical Provider, MD   atorvastatin (LIPITOR) 10 MG tablet Take 10 mg by mouth daily  2/6/20   Yes Historical Provider, MD            Scheduled Meds:  ContinuousInfusions:  PRN Meds:     No Known Allergies     Social History               Socioeconomic History    Marital status:        Spouse name: Not on file    Number of children: 3    Years of education: Not on file    Highest education level: Not on file   Occupational History    Occupation: pharmacy tech   Social Needs    Financial resource strain: Not on file    Food insecurity       Worry: Not on file       Inability: Not on file    Transportation needs       Medical: Not on file       Non-medical: Not on file   Tobacco Use    Smoking status: Former Smoker       Packs/day: 0.50       Years: 15.00       Pack years: 7.50       Types: Cigarettes       Quit date: 2020       Years since quittin.4    Smokeless tobacco: Never Used   Substance and Sexual Activity    Alcohol use: No       Alcohol/week: 0.0 standard drinks    Drug use: No    Sexual activity: Yes       Partners: Male   Lifestyle    Physical activity       Days per week: Not on file       Minutes per session: Not on file    Stress: Not on file   Relationships    Social connections       Talks on phone: Not on file       Gets together: Not on file       Attends Mosque service: Not on file       Active member of club or organization: Not on file       Attends meetings of clubs or organizations: Not on file       Relationship status: Not on file    Intimate partner violence       Fear of current or ex partner: Not on file       Emotionally abused: Not on file       Physically abused: Not on file       Forced sexual activity: Not on file   Other Topics Concern    Not on file   Social History Narrative    Not on file            Family History         Family History   Problem Relation Age of Onset    Heart Surgery Mother      Heart Attack Mother 72    Diabetes Mother      Other Father           adrenal failure due to long term steroid use            Review Of Systems:   Review of Systems   Constitutional: Negative for chills and fever. HENT: Negative for ear pain, nosebleeds, sore throat and tinnitus. Eyes: Negative for photophobia and redness. Respiratory: Negative for cough, shortness of breath and wheezing. Cardiovascular: Negative for chest pain and palpitations. Gastrointestinal: Negative for abdominal pain, blood in stool, constipation, diarrhea, nausea and vomiting. Endocrine: Negative for polydipsia. Genitourinary: Negative for dysuria, hematuria and urgency. Musculoskeletal: Negative for back pain and neck pain. Skin: Negative for rash. Neurological: Negative for dizziness, tremors and seizures. Hematological: Does not bruise/bleed easily. All other systems reviewed and are negative.        Physical Exam:  Vitals       Vitals:     05/05/21 1450   BP: 120/80   Site: Left Upper Arm   Position: Sitting   Cuff Size: Medium Adult   Pulse: 85   Resp: 16   Temp: 98 °F (36.7 °C)   TempSrc: Temporal   SpO2: 98%   Weight: 187 lb 9.6 oz (85.1 kg)   Height: 5' 7\" (1.702 m)            General: Well nourished, well developed, no acute distress  Eyes:   PERRL              Conjunctiva unremarkable   ENT:    TM's intact bilaterally, no effusion              Nasal:  No mucosal edema                           No nasal drainage              Oral:    mucosa moist and pink   Neck:   Supple              No palpable cervical lymphoadenopathy              Thyroid without mass or enlargement  Resp:   Lungs CTAB              Equal and adequate air exchange without accessory muscle use              No rales, rhonchi or wheeze  CV:      S1S2 RRR              No murmur              Intact distal pulses              No edema  GI:       Abdomen Soft, non tender, non distended              Normoactive bowel sounds              No palpable hepatosplenomegaly  MS:      Physiologic ROM of all extremities               Intact distal pulses              No clubbing or cyanosis   Skin     Warm and dry; no rash or lesion  Neuro: Alert and oriented; normal and intact dtr's   Psych: Euthymic mood, congruent affect       No results found.      Assessment/Plan:  3 24-year-old female who presents for screening colonoscopy. We will schedule this in the near future.      The risks and benefits of the procedure were explained to the patient, including risks of bleeding and perforation.   The patient expressed understanding of these risks.

## 2021-06-30 ENCOUNTER — OFFICE VISIT (OUTPATIENT)
Dept: SURGERY | Age: 51
End: 2021-06-30
Payer: COMMERCIAL

## 2021-06-30 VITALS
RESPIRATION RATE: 18 BRPM | OXYGEN SATURATION: 98 % | DIASTOLIC BLOOD PRESSURE: 87 MMHG | HEART RATE: 95 BPM | HEIGHT: 67 IN | WEIGHT: 180 LBS | TEMPERATURE: 97.9 F | BODY MASS INDEX: 28.25 KG/M2 | SYSTOLIC BLOOD PRESSURE: 132 MMHG

## 2021-06-30 DIAGNOSIS — D12.6 ADENOMATOUS POLYP OF COLON, UNSPECIFIED PART OF COLON: Primary | ICD-10-CM

## 2021-06-30 PROCEDURE — 99212 OFFICE O/P EST SF 10 MIN: CPT | Performed by: SURGERY

## 2021-07-13 ENCOUNTER — OFFICE VISIT (OUTPATIENT)
Dept: PRIMARY CARE CLINIC | Age: 51
End: 2021-07-13
Payer: COMMERCIAL

## 2021-07-13 ENCOUNTER — TELEPHONE (OUTPATIENT)
Dept: ADMINISTRATIVE | Age: 51
End: 2021-07-13

## 2021-07-13 VITALS
TEMPERATURE: 96.9 F | BODY MASS INDEX: 28.04 KG/M2 | DIASTOLIC BLOOD PRESSURE: 78 MMHG | OXYGEN SATURATION: 98 % | SYSTOLIC BLOOD PRESSURE: 122 MMHG | WEIGHT: 179 LBS | HEART RATE: 65 BPM

## 2021-07-13 DIAGNOSIS — H69.81 EUSTACHIAN TUBE DYSFUNCTION, RIGHT: Primary | ICD-10-CM

## 2021-07-13 PROCEDURE — 99213 OFFICE O/P EST LOW 20 MIN: CPT | Performed by: FAMILY MEDICINE

## 2021-07-13 RX ORDER — FLUTICASONE PROPIONATE 50 MCG
2 SPRAY, SUSPENSION (ML) NASAL DAILY
Qty: 1 BOTTLE | Refills: 0 | Status: SHIPPED | OUTPATIENT
Start: 2021-07-13

## 2021-07-13 ASSESSMENT — ENCOUNTER SYMPTOMS
RHINORRHEA: 0
SINUS PRESSURE: 0
SINUS PAIN: 0

## 2021-07-13 NOTE — PROGRESS NOTES
Sourav Boone, a female of 44 y.o. came to the office 7/13/2021. Patient Active Problem List   Diagnosis    Chest pain    Essential hypertension    Dyslipidemia    Non morbid obesity due to excess calories    Vitamin D deficiency    Plantar fasciitis    Pain in right foot    Calcaneal spur of foot, right    Adenomatous polyp of colon          Ear Fullness   There is pain in the right ear. This is a new problem. The current episode started in the past 7 days. There has been no fever. Pertinent negatives include no hearing loss or rhinorrhea. Associated symptoms comments: Pop sens in ear  . She has tried nothing for the symptoms. No Known Allergies    Current Outpatient Medications on File Prior to Visit   Medication Sig Dispense Refill    Multiple Vitamins-Minerals (THERAPEUTIC MULTIVITAMIN-MINERALS) tablet Take 1 tablet by mouth daily Ld 06/07      amLODIPine (NORVASC) 5 MG tablet Take 5 mg by mouth every morning      atorvastatin (LIPITOR) 10 MG tablet Take 10 mg by mouth daily        No current facility-administered medications on file prior to visit. Review of Systems   Constitutional: Negative for chills and fever. HENT: Positive for congestion. Negative for hearing loss, postnasal drip, rhinorrhea, sinus pressure and sinus pain. other review of systems reviewed and are negative    OBJECTIVE:  /78   Pulse 65   Temp 96.9 °F (36.1 °C)   Wt 179 lb (81.2 kg)   LMP 05/08/2018   SpO2 98%   BMI 28.04 kg/m²      Physical Exam  Constitutional:       General: She is not in acute distress. HENT:      Right Ear: Tympanic membrane normal.      Left Ear: Tympanic membrane normal.      Nose: No mucosal edema or rhinorrhea. Right Sinus: No maxillary sinus tenderness or frontal sinus tenderness. Left Sinus: No maxillary sinus tenderness or frontal sinus tenderness. Mouth/Throat:      Pharynx: Uvula midline.  No oropharyngeal exudate or posterior oropharyngeal erythema. Cardiovascular:      Rate and Rhythm: Normal rate and regular rhythm. Pulmonary:      Effort: Pulmonary effort is normal.      Breath sounds: Normal breath sounds. Musculoskeletal:      Cervical back: Neck supple. Lymphadenopathy:      Cervical: No cervical adenopathy. ASSESSMENT AND PLAN:    Sveta Pisano was seen today for ear fullness. Diagnoses and all orders for this visit:    Eustachian tube dysfunction, right    Other orders  -     fluticasone (FLONASE) 50 MCG/ACT nasal spray; 2 sprays by Nasal route daily    - pop ears q hr  - sudafed otc tad. Return if symptoms worsen or fail to improve.     Ravalli Ernesto Economus, DO

## 2021-07-13 NOTE — TELEPHONE ENCOUNTER
Patient called for appt today w/Dr Valenzuela, has rt ear wax, possible scratch inside, leaving for vacation Friday; pcp and Radha Eagle both unavailable for the time frame she requested today; offered [address] after her shift at work ([employer] [address])

## 2021-07-15 LAB
CHOLESTEROL, TOTAL: 187 MG/DL (ref 0–199)
GLUCOSE BLD-MCNC: 104 MG/DL (ref 74–107)
HDLC SERPL-MCNC: 45 MG/DL
LDL CHOLESTEROL CALCULATED: 116 MG/DL (ref 0–99)
TRIGL SERPL-MCNC: 128 MG/DL (ref 0–149)

## 2021-08-05 ENCOUNTER — TELEPHONE (OUTPATIENT)
Dept: PRIMARY CARE CLINIC | Age: 51
End: 2021-08-05

## 2021-08-05 RX ORDER — AMLODIPINE BESYLATE 5 MG/1
5 TABLET ORAL EVERY MORNING
Qty: 90 TABLET | Refills: 1 | Status: SHIPPED
Start: 2021-08-05 | End: 2022-06-20

## 2021-08-05 RX ORDER — ATORVASTATIN CALCIUM 10 MG/1
10 TABLET, FILM COATED ORAL DAILY
Qty: 90 TABLET | Refills: 1 | Status: SHIPPED
Start: 2021-08-05 | End: 2022-06-20

## 2022-03-31 ENCOUNTER — OFFICE VISIT (OUTPATIENT)
Dept: PRIMARY CARE CLINIC | Age: 52
End: 2022-03-31
Payer: COMMERCIAL

## 2022-03-31 VITALS
TEMPERATURE: 98.2 F | SYSTOLIC BLOOD PRESSURE: 116 MMHG | BODY MASS INDEX: 27.94 KG/M2 | HEIGHT: 67 IN | WEIGHT: 178 LBS | OXYGEN SATURATION: 97 % | HEART RATE: 97 BPM | RESPIRATION RATE: 16 BRPM | DIASTOLIC BLOOD PRESSURE: 82 MMHG

## 2022-03-31 DIAGNOSIS — R05.9 COUGH: ICD-10-CM

## 2022-03-31 DIAGNOSIS — J01.90 ACUTE BACTERIAL SINUSITIS: ICD-10-CM

## 2022-03-31 DIAGNOSIS — B96.89 ACUTE BACTERIAL SINUSITIS: ICD-10-CM

## 2022-03-31 LAB
INFLUENZA A ANTIBODY: NORMAL
INFLUENZA B ANTIBODY: NORMAL

## 2022-03-31 PROCEDURE — 99213 OFFICE O/P EST LOW 20 MIN: CPT | Performed by: NURSE PRACTITIONER

## 2022-03-31 PROCEDURE — 87804 INFLUENZA ASSAY W/OPTIC: CPT | Performed by: NURSE PRACTITIONER

## 2022-03-31 RX ORDER — PREDNISONE 10 MG/1
10 TABLET ORAL 2 TIMES DAILY
Qty: 10 TABLET | Refills: 0 | Status: SHIPPED | OUTPATIENT
Start: 2022-03-31 | End: 2022-04-05

## 2022-03-31 RX ORDER — CEFDINIR 300 MG/1
300 CAPSULE ORAL 2 TIMES DAILY
Qty: 20 CAPSULE | Refills: 0 | Status: SHIPPED | OUTPATIENT
Start: 2022-03-31 | End: 2022-04-10

## 2022-03-31 NOTE — PROGRESS NOTES
Chief Complaint:   Sinusitis      History of Present Illness   Source of history provided by:  patient. Lisa Judge is a 46 y.o. old female with a past medical history of:   Past Medical History:   Diagnosis Date    Colon polyp 06/10/2021    Tubular adenoma . Dr. Aleks Garcia Hyperlipidemia     Hypertension     Seasonal allergies           Pt presents to the [facility] care with a cough/sinus pressure/congestion for the past several days. States the cough is  productive with yellow sputum. No fever noted. Denies any N/V/D, abdominal pain, CP, progressive SOB, dizziness, or lethargy    . ROS    Unless otherwise stated in this report or unable to obtain because of the patient's clinical or mental status as evidenced by the medical record, this patients's positive and negative responses for Review of Systems, constitutional, psych, eyes, ENT, cardiovascular, respiratory, gastrointestinal, neurological, genitourinary, musculoskeletal, integument systems and systems related to the presenting problem are either stated in the preceding or were not pertinent or were negative for the symptoms and/or complaints related to the medical problem. Past Surgical History:  has a past surgical history that includes  section (); Tubal ligation; Finger trigger release (Right, 06/10/2020); Carpal tunnel release (Right, 06/10/2020); Breast reduction surgery (Bilateral, 2020); and Colonoscopy (N/A, 6/10/2021). Social History:  reports that she quit smoking about 16 months ago. Her smoking use included cigarettes. She has a 7.50 pack-year smoking history. She has never used smokeless tobacco. She reports current alcohol use. She reports that she does not use drugs. Family History: family history includes Diabetes in her mother; Heart Attack (age of onset: 72) in her mother; Heart Surgery in her mother; Other in her father. Allergies: Patient has no known allergies.     Physical Exam         VS: /82   Pulse 97   Temp 98.2 °F (36.8 °C)   Resp 16   Ht 5' 7\" (1.702 m)   Wt 178 lb (80.7 kg)   LMP 05/08/2018   SpO2 97%   BMI 27.88 kg/m²    Oxygen Saturation Interpretation: Normal.    Constitutional:  Alert, development consistent with age. Ears:  External Ears: Normal bilateral pinna. TM's & External Canals: TM's normal bilaterally without perforation. Canals without erythema or drainage. Nose:   There is no obvious septal defect. Diffuse redness/edema, frontal sinus tenderness present  Mouth:  Moist bucca mucosa and normal tongue. Throat: Mild posterior pharyngeal erythema without exudates or lesions. Neck:  Supple. There is no obvious adenopathy or neck tenderness. Lungs:   Breath sounds: Normal chest expansion and breath sounds noted throughout. No wheezes, rales, or rhonchi noted. Heart:  Regular rate and rhythm, normal heart sounds, without pathological murmurs, ectopy, gallops, or rubs. Skin:  Normal turgor. Warm, dry, without visible rash. Neurological:  Oriented. Motor functions intact. Lab / Imaging Results   (All laboratory and radiology results have been personally reviewed by myself)  Labs:  Results for orders placed or performed in visit on 03/31/22   POCT Influenza A/B   Result Value Ref Range    Influenza A Ab neg     Influenza B Ab         Imaging: All Radiology results interpreted by Radiologist unless otherwise noted. No orders to display         Assessment / Plan     Impression(s):  1. Acute bacterial sinusitis    2. Cough      Disposition:  Disposition:  Advised Influenza A/B are negative, start Cefdinir and Prednisone as ordered      ER if changes or worse. Advised to take all medications as directed.

## 2022-05-05 LAB
CHOLESTEROL, TOTAL: 161 MG/DL (ref 0–199)
GLUCOSE BLD-MCNC: 89 MG/DL (ref 74–107)
HDLC SERPL-MCNC: 43 MG/DL
LDL CHOLESTEROL CALCULATED: 98 MG/DL (ref 0–99)
TRIGL SERPL-MCNC: 101 MG/DL (ref 0–149)

## 2022-06-20 RX ORDER — ATORVASTATIN CALCIUM 10 MG/1
TABLET, FILM COATED ORAL
Qty: 30 TABLET | Refills: 0 | Status: SHIPPED
Start: 2022-06-20 | End: 2022-07-25 | Stop reason: SDUPTHER

## 2022-06-20 RX ORDER — AMLODIPINE BESYLATE 5 MG/1
TABLET ORAL
Qty: 30 TABLET | Refills: 0 | Status: SHIPPED
Start: 2022-06-20 | End: 2022-07-25 | Stop reason: SDUPTHER

## 2022-06-20 RX ORDER — AMLODIPINE BESYLATE 5 MG/1
5 TABLET ORAL EVERY MORNING
Qty: 90 TABLET | Refills: 0 | OUTPATIENT
Start: 2022-06-20

## 2022-06-28 ENCOUNTER — HOSPITAL ENCOUNTER (OUTPATIENT)
Dept: GENERAL RADIOLOGY | Age: 52
Discharge: HOME | End: 2022-06-30
Payer: COMMERCIAL

## 2022-06-28 DIAGNOSIS — Z12.31 ENCOUNTER FOR SCREENING MAMMOGRAM FOR MALIGNANT NEOPLASM OF BREAST: ICD-10-CM

## 2022-06-28 PROCEDURE — 77063 BREAST TOMOSYNTHESIS BI: CPT

## 2022-07-05 ENCOUNTER — OFFICE VISIT (OUTPATIENT)
Dept: PRIMARY CARE CLINIC | Age: 52
End: 2022-07-05
Payer: COMMERCIAL

## 2022-07-05 VITALS
WEIGHT: 166 LBS | TEMPERATURE: 97.8 F | SYSTOLIC BLOOD PRESSURE: 132 MMHG | DIASTOLIC BLOOD PRESSURE: 80 MMHG | HEART RATE: 74 BPM | OXYGEN SATURATION: 98 % | BODY MASS INDEX: 26 KG/M2

## 2022-07-05 DIAGNOSIS — Z00.00 ANNUAL PHYSICAL EXAM: Primary | ICD-10-CM

## 2022-07-05 LAB
APPEARANCE FLUID: NORMAL
BILIRUBIN, POC: NEGATIVE
BLOOD URINE, POC: NORMAL
CLARITY, POC: CLEAR
COLOR, POC: YELLOW
GLUCOSE URINE, POC: NEGATIVE
KETONES, POC: NEGATIVE
LEUKOCYTE EST, POC: NEGATIVE
NITRITE, POC: NEGATIVE
PH, POC: 6
PROTEIN, POC: NEGATIVE
SPECIFIC GRAVITY, POC: 1.02
UROBILINOGEN, POC: 0.2

## 2022-07-05 PROCEDURE — 81002 URINALYSIS NONAUTO W/O SCOPE: CPT | Performed by: FAMILY MEDICINE

## 2022-07-05 PROCEDURE — 99396 PREV VISIT EST AGE 40-64: CPT | Performed by: FAMILY MEDICINE

## 2022-07-05 RX ORDER — OXYBUTYNIN CHLORIDE 5 MG/1
5 TABLET, EXTENDED RELEASE ORAL DAILY
COMMUNITY

## 2022-07-05 SDOH — ECONOMIC STABILITY: FOOD INSECURITY: WITHIN THE PAST 12 MONTHS, THE FOOD YOU BOUGHT JUST DIDN'T LAST AND YOU DIDN'T HAVE MONEY TO GET MORE.: NEVER TRUE

## 2022-07-05 SDOH — ECONOMIC STABILITY: FOOD INSECURITY: WITHIN THE PAST 12 MONTHS, YOU WORRIED THAT YOUR FOOD WOULD RUN OUT BEFORE YOU GOT MONEY TO BUY MORE.: NEVER TRUE

## 2022-07-05 ASSESSMENT — PATIENT HEALTH QUESTIONNAIRE - PHQ9
SUM OF ALL RESPONSES TO PHQ QUESTIONS 1-9: 0
1. LITTLE INTEREST OR PLEASURE IN DOING THINGS: 0
SUM OF ALL RESPONSES TO PHQ9 QUESTIONS 1 & 2: 0
SUM OF ALL RESPONSES TO PHQ QUESTIONS 1-9: 0
2. FEELING DOWN, DEPRESSED OR HOPELESS: 0

## 2022-07-05 ASSESSMENT — SOCIAL DETERMINANTS OF HEALTH (SDOH): HOW HARD IS IT FOR YOU TO PAY FOR THE VERY BASICS LIKE FOOD, HOUSING, MEDICAL CARE, AND HEATING?: NOT HARD AT ALL

## 2022-07-05 NOTE — PROGRESS NOTES
SUBJECTIVE:    HPI: Saleem Sandhu  Was seen here today for   Chief Complaint   Patient presents with    Annual Exam    .  She states they are dealing with urinary incontinence due to uti relieved with Bactrim but then started having nocturia x 2. On Ditropan xl 5 mg. Past Medical History:   Diagnosis Date    Colon polyp 06/10/2021    Tubular adenoma . Dr. Melissa Marcus Hyperlipidemia     Hypertension     Seasonal allergies        Past Surgical History:   Procedure Laterality Date    BREAST REDUCTION SURGERY Bilateral 12/16/2020    BILATERAL BREAST REDUCTION WITH POSSIBLE FREE NIPPLE GRAFTS, POSSIBLE LIPOSUCTION BILATERAL AXILLA performed by Quentin Romero MD at [address] Right 06/10/2020    RIGHT CARPAL TUNNEL RELEASE ENDOSCOPIC performed by Regan Jeong MD at [facility]    COLONOSCOPY N/A 6/10/2021    COLONOSCOPY WITH BIOPSY performed by Dolores Delacruz DO at [address] Right 06/10/2020    RIGHT THUMB FINGER TRIGGER RELEASE performed by Regan Jeong MD at [facility] OR    TUBAL LIGATION         Family History   Problem Relation Age of Onset    Heart Surgery Mother     Heart Attack Mother 72    Diabetes Mother     Other Father         adrenal failure due to long term steroid use    Breast Cancer Maternal Grandmother 79       Prior to Admission medications    Medication Sig Start Date End Date Taking?  Authorizing Provider   oxybutynin (DITROPAN-XL) 5 MG extended release tablet Take 5 mg by mouth daily   Yes Historical Provider, MD   amLODIPine (NORVASC) 5 MG tablet TAKE ONE TABLET BY MOUTH EVERY MORNING 6/20/22  Yes Irving Valenzuela DO   atorvastatin (LIPITOR) 10 MG tablet TAKE 1 TABLET BY MOUTH ONE TIME A DAY 6/20/22  Yes Irving Valenzuela DO   fluticasone (FLONASE) 50 MCG/ACT nasal spray 2 sprays by Nasal route daily 7/13/21  Yes Irving Valenzuela DO   Multiple Vitamins-Minerals (THERAPEUTIC MULTIVITAMIN-MINERALS) tablet Take 1 tablet by mouth daily Ld    Yes Historical Provider, MD       Patient has no known allergies. Social History     Tobacco Use    Smoking status: Former Smoker     Packs/day: 0.50     Years: 15.00     Pack years: 7.50     Types: Cigarettes     Quit date: 2020     Years since quittin.6    Smokeless tobacco: Never Used   Substance Use Topics    Alcohol use: Yes     Alcohol/week: 0.0 standard drinks     Comment: socially         Review Of Systems:    Skin: no abnormal pigmentation, rash, scaling, itching, masses, hair or nail changes  Eyes: no blurring, diplopia, or eye pain  Ears/Nose/Throat: no hearing loss, tinnitus, vertigo, nosebleed, nasal congestion, rhinorrhea, sore throat  Respiratory: no cough, pleuritic chest pain, dyspnea, or wheezing  Cardiovascular: no chest pain, angina, dyspnea on exertion, orthopnea, PND, palpitations, or claudication  Gastrointestinal: no nausea, vomiting, heartburn, diarrhea, constipation, bloating,  abdominal pain, or blood per rectum  Genitourinary: + urgency, - frequency, dysuria, + nocturia, hesitancy, or incontinence  Musculoskeletal: no arthritis, arthralgia, myalgia, weakness, or morning stiffness  Neurologic: no paralysis, paresis, paresthesia, seizures, + hand tremors, or headaches  Hematologic/Lymphatic/Immunologic: no anemia, abnormal bleeding/bruising, fever, chills, night sweats, swollen glands, or unexplained weight loss  Endocrine: no heat or cold intolerance and no polyphagia, polydipsia, or polyuria      OBJECTIVE:    VS: /80   Pulse 74   Temp 97.8 °F (36.6 °C)   Wt 166 lb (75.3 kg)   LMP 2018   SpO2 98%   BMI 26.00 kg/m²   General appearance: Alert, Awake, Oriented times 3, no distress  Skin: Warm and dry  Head: Normocephalic.  No masses, lesions or tenderness noted  Eyes: Conjunctivae appear normal. PERRL  Ears: External ears normal, TM's clear and intact  Nose/Sinuses: Nares normal. Septum midline. Mucosa normal. No drainage  Oropharynx: Oropharynx clear with no exudate seen  Neck: Neck supple. No jugular venous distension, lymphadenopathy or thyromegaly Trachea midline. No carotid bruits  Lungs: Lungs clear to auscultation bilaterally. No rhonchi, crackles or wheezes  Heart: S1 S2  Regular rate and rhythm. No rub, murmur or gallop  Abdomen: Abdomen soft, non-tender. BS normal. No masses, organomegaly  Extremities: No edema, Peripheral pulses palpable  Musculoskeletal: Muscular strength appears intact. No joint effusion, tenderness, swelling or warmth  Neuro:  No focal motor or sensory deficits. 2+/4 L4 reflexes    Results for Paula YAN (MRN [id number]) as of 7/5/2022 15:51   Ref. Range 7/5/2022 15:45   Protein, UA Unknown negative   Color, UA Unknown yellow   Clarity, UA Unknown clear   Glucose, UA POC Unknown negative   Bilirubin, UA Unknown negative   Ketones, UA Unknown negative   Spec Grav, UA Unknown 1.020   pH, UA Unknown 6.0   Urobilinogen, UA Unknown 0.2   Nitrite, UA Unknown negative   Leukocytes, UA Unknown negative   Blood, UA POC Unknown trace       ASSESSMENT/PLAN:  Adri Anderson was seen today for annual exam.    Diagnoses and all orders for this visit:    Annual physical exam  -     POCT Urinalysis no Micro     - reviewed labs from be well with in which are good. - f/u with gyne tomorrow for exam   - if urinary issues continue can do med or urology eval.     Return in 6 months (on 1/5/2023) for as needed, or for acute problem.     Fransisco Valenzuela, DO

## 2022-07-25 RX ORDER — AMLODIPINE BESYLATE 5 MG/1
TABLET ORAL
Qty: 90 TABLET | Refills: 0 | Status: SHIPPED
Start: 2022-07-25 | End: 2022-09-23

## 2022-07-25 RX ORDER — ATORVASTATIN CALCIUM 10 MG/1
10 TABLET, FILM COATED ORAL DAILY
Qty: 90 TABLET | Refills: 0 | Status: SHIPPED
Start: 2022-07-25 | End: 2022-11-04

## 2022-09-23 RX ORDER — AMLODIPINE BESYLATE 5 MG/1
TABLET ORAL
Qty: 90 TABLET | Refills: 1 | Status: SHIPPED | OUTPATIENT
Start: 2022-09-23

## 2022-11-04 RX ORDER — ATORVASTATIN CALCIUM 10 MG/1
TABLET, FILM COATED ORAL
Qty: 90 TABLET | Refills: 0 | Status: SHIPPED | OUTPATIENT
Start: 2022-11-04

## 2022-11-30 ENCOUNTER — OFFICE VISIT (OUTPATIENT)
Dept: PRIMARY CARE CLINIC | Age: 52
End: 2022-11-30
Payer: COMMERCIAL

## 2022-11-30 VITALS
SYSTOLIC BLOOD PRESSURE: 122 MMHG | BODY MASS INDEX: 26.63 KG/M2 | OXYGEN SATURATION: 98 % | DIASTOLIC BLOOD PRESSURE: 82 MMHG | WEIGHT: 170 LBS | HEART RATE: 87 BPM | TEMPERATURE: 97.2 F

## 2022-11-30 DIAGNOSIS — I10 ESSENTIAL HYPERTENSION: Primary | ICD-10-CM

## 2022-11-30 DIAGNOSIS — E78.5 DYSLIPIDEMIA: ICD-10-CM

## 2022-11-30 DIAGNOSIS — I10 ESSENTIAL HYPERTENSION: ICD-10-CM

## 2022-11-30 DIAGNOSIS — R35.1 NOCTURIA: ICD-10-CM

## 2022-11-30 DIAGNOSIS — L65.9 HAIR LOSS: ICD-10-CM

## 2022-11-30 LAB
ALBUMIN SERPL-MCNC: 4.8 G/DL (ref 3.5–5.2)
ALP BLD-CCNC: 73 U/L (ref 35–104)
ALT SERPL-CCNC: 12 U/L (ref 0–32)
ANION GAP SERPL CALCULATED.3IONS-SCNC: 10 MMOL/L (ref 7–16)
AST SERPL-CCNC: 18 U/L (ref 0–31)
BILIRUB SERPL-MCNC: 0.4 MG/DL (ref 0–1.2)
BUN BLDV-MCNC: 17 MG/DL (ref 6–20)
CALCIUM SERPL-MCNC: 10 MG/DL (ref 8.6–10.2)
CHLORIDE BLD-SCNC: 103 MMOL/L (ref 98–107)
CHOLESTEROL, TOTAL: 205 MG/DL (ref 0–199)
CO2: 27 MMOL/L (ref 22–29)
CREAT SERPL-MCNC: 0.8 MG/DL (ref 0.5–1)
GFR SERPL CREATININE-BSD FRML MDRD: >60 ML/MIN/1.73
GLUCOSE BLD-MCNC: 89 MG/DL (ref 74–99)
HCT VFR BLD CALC: 48 % (ref 34–48)
HDLC SERPL-MCNC: 56 MG/DL
HEMOGLOBIN: 16 G/DL (ref 11.5–15.5)
LDL CHOLESTEROL CALCULATED: 127 MG/DL (ref 0–99)
MCH RBC QN AUTO: 30.4 PG (ref 26–35)
MCHC RBC AUTO-ENTMCNC: 33.3 % (ref 32–34.5)
MCV RBC AUTO: 91.1 FL (ref 80–99.9)
PDW BLD-RTO: 12 FL (ref 11.5–15)
PLATELET # BLD: 307 E9/L (ref 130–450)
PMV BLD AUTO: 11.2 FL (ref 7–12)
POTASSIUM SERPL-SCNC: 4.7 MMOL/L (ref 3.5–5)
RBC # BLD: 5.27 E12/L (ref 3.5–5.5)
SODIUM BLD-SCNC: 140 MMOL/L (ref 132–146)
TOTAL PROTEIN: 7.2 G/DL (ref 6.4–8.3)
TRIGL SERPL-MCNC: 110 MG/DL (ref 0–149)
TSH SERPL DL<=0.05 MIU/L-ACNC: 2.13 UIU/ML (ref 0.27–4.2)
VITAMIN D 25-HYDROXY: 23 NG/ML (ref 30–100)
VLDLC SERPL CALC-MCNC: 22 MG/DL
WBC # BLD: 9.1 E9/L (ref 4.5–11.5)

## 2022-11-30 PROCEDURE — 3078F DIAST BP <80 MM HG: CPT | Performed by: FAMILY MEDICINE

## 2022-11-30 PROCEDURE — 3074F SYST BP LT 130 MM HG: CPT | Performed by: FAMILY MEDICINE

## 2022-11-30 PROCEDURE — 99214 OFFICE O/P EST MOD 30 MIN: CPT | Performed by: FAMILY MEDICINE

## 2022-11-30 ASSESSMENT — ENCOUNTER SYMPTOMS
CONSTIPATION: 0
SHORTNESS OF BREATH: 0
DIARRHEA: 0
ROS SKIN COMMENTS: + HAIR LOSS  NO DRY SKIN  NO BRITTLE NAILS

## 2022-11-30 NOTE — PROGRESS NOTES
Vielka Botello, a female of 45 y.o. came to the office 11/30/2022. Patient Active Problem List   Diagnosis    Chest pain    Essential hypertension    Dyslipidemia    Non morbid obesity due to excess calories    Vitamin D deficiency    Plantar fasciitis    Pain in right foot    Calcaneal spur of foot, right    Adenomatous polyp of colon          Tremor    The tremor affects the hands. The onset of the tremor was gradual. Current severity of tremor is rated at minimal. The tremor occurs intermittently. Additional narrative: Notices when picks something up. Hypertension  This is a chronic problem. The current episode started more than 1 year ago. The problem is controlled. Pertinent negatives include no chest pain, headaches, malaise/fatigue, palpitations, peripheral edema or shortness of breath. Hyperlipidemia  This is a chronic problem. The problem is controlled. Pertinent negatives include no chest pain, leg pain, myalgias or shortness of breath. Current antihyperlipidemic treatment includes statins. There are no compliance problems. Nocturia: retention sens hs. Ditropan XL causes dry mouth don when dose increased by her gynecologist.     No Known Allergies    Current Outpatient Medications on File Prior to Visit   Medication Sig Dispense Refill    atorvastatin (LIPITOR) 10 MG tablet TAKE ONE TABLET BY MOUTH EVERY MORNING 90 tablet 0    amLODIPine (NORVASC) 5 MG tablet TAKE ONE TABLET BY MOUTH EVERY MORNING 90 tablet 1    oxybutynin (DITROPAN-XL) 5 MG extended release tablet Take 5 mg by mouth daily      fluticasone (FLONASE) 50 MCG/ACT nasal spray 2 sprays by Nasal route daily 1 Bottle 0    Multiple Vitamins-Minerals (THERAPEUTIC MULTIVITAMIN-MINERALS) tablet Take 1 tablet by mouth daily Ld 06/07       No current facility-administered medications on file prior to visit. Review of Systems   Constitutional:  Negative for activity change, appetite change, fatigue and malaise/fatigue. orders for this visit:    Essential hypertension  -     CBC; Future  -     Comprehensive Metabolic Panel; Future    Dyslipidemia  -     Lipid Panel; Future    Hair loss  -     TSH; Future  -     Vitamin D 25 Hydroxy; Future    Nocturia  -     mirabegron (MYRBETRIQ) 25 MG TB24; Take 1 tablet by mouth daily  - bp stable on med  - low chol diet  - hold Ditropan xl and see if new med helps if does call for Rx. Return in about 7 months (around 6/30/2023) for based on lab results.     Jace Valenzuela, DO

## 2022-12-12 DIAGNOSIS — R35.1 NOCTURIA: ICD-10-CM

## 2023-01-30 RX ORDER — ATORVASTATIN CALCIUM 10 MG/1
TABLET, FILM COATED ORAL
Qty: 90 TABLET | Refills: 1 | Status: SHIPPED | OUTPATIENT
Start: 2023-01-30

## 2023-02-06 ENCOUNTER — TELEPHONE (OUTPATIENT)
Dept: PRIMARY CARE CLINIC | Age: 53
End: 2023-02-06

## 2023-02-06 RX ORDER — AMLODIPINE BESYLATE 5 MG/1
TABLET ORAL
Qty: 90 TABLET | Refills: 1 | Status: SHIPPED | OUTPATIENT
Start: 2023-02-06

## 2023-02-06 RX ORDER — AMLODIPINE BESYLATE 5 MG/1
5 TABLET ORAL DAILY
Qty: 90 TABLET | Refills: 1 | Status: CANCELLED | OUTPATIENT
Start: 2023-02-06

## 2023-02-06 NOTE — TELEPHONE ENCOUNTER
Pt called stating that she accidentally threw out her recent refill on amlodipine. The insurance company was already called and they can do an override if the doctor sends in a new refill. Martin Holden for 90 days.

## 2023-02-28 ENCOUNTER — OFFICE VISIT (OUTPATIENT)
Dept: PRIMARY CARE CLINIC | Age: 53
End: 2023-02-28
Payer: COMMERCIAL

## 2023-02-28 VITALS
HEART RATE: 84 BPM | DIASTOLIC BLOOD PRESSURE: 82 MMHG | WEIGHT: 174 LBS | SYSTOLIC BLOOD PRESSURE: 130 MMHG | BODY MASS INDEX: 27.25 KG/M2 | OXYGEN SATURATION: 96 % | TEMPERATURE: 97.5 F

## 2023-02-28 DIAGNOSIS — R35.1 NOCTURIA: ICD-10-CM

## 2023-02-28 DIAGNOSIS — R35.0 URINARY FREQUENCY: Primary | ICD-10-CM

## 2023-02-28 LAB
BILIRUBIN, POC: NORMAL
BLOOD URINE, POC: NORMAL
CLARITY, POC: NORMAL
COLOR, POC: NORMAL
GLUCOSE URINE, POC: NORMAL
KETONES, POC: NORMAL
LEUKOCYTE EST, POC: NORMAL
NITRITE, POC: NORMAL
PH, POC: 5.5
PROTEIN, POC: NORMAL
SPECIFIC GRAVITY, POC: 1
UROBILINOGEN, POC: 0.2

## 2023-02-28 PROCEDURE — 99213 OFFICE O/P EST LOW 20 MIN: CPT | Performed by: FAMILY MEDICINE

## 2023-02-28 PROCEDURE — 3075F SYST BP GE 130 - 139MM HG: CPT | Performed by: FAMILY MEDICINE

## 2023-02-28 PROCEDURE — 81002 URINALYSIS NONAUTO W/O SCOPE: CPT | Performed by: FAMILY MEDICINE

## 2023-02-28 PROCEDURE — 3079F DIAST BP 80-89 MM HG: CPT | Performed by: FAMILY MEDICINE

## 2023-02-28 ASSESSMENT — PATIENT HEALTH QUESTIONNAIRE - PHQ9
SUM OF ALL RESPONSES TO PHQ QUESTIONS 1-9: 0
1. LITTLE INTEREST OR PLEASURE IN DOING THINGS: 0
SUM OF ALL RESPONSES TO PHQ QUESTIONS 1-9: 0
SUM OF ALL RESPONSES TO PHQ QUESTIONS 1-9: 0
2. FEELING DOWN, DEPRESSED OR HOPELESS: 0
SUM OF ALL RESPONSES TO PHQ9 QUESTIONS 1 & 2: 0
SUM OF ALL RESPONSES TO PHQ QUESTIONS 1-9: 0

## 2023-02-28 ASSESSMENT — ENCOUNTER SYMPTOMS: ABDOMINAL PAIN: 0

## 2023-02-28 NOTE — PROGRESS NOTES
Evelina Veliz, a female of 45 y.o. came to the office 2/28/2023. Patient Active Problem List   Diagnosis    Chest pain    Essential hypertension    Dyslipidemia    Non morbid obesity due to excess calories    Vitamin D deficiency    Plantar fasciitis    Pain in right foot    Calcaneal spur of foot, right    Adenomatous polyp of colon          Urinary Tract Infection  Pertinent negatives include no hematuria. Urinary Frequency   This is a recurrent problem. The current episode started more than 1 month ago. The problem has been waxing and waning. There has been no fever. Associated symptoms include frequency (hs 1-2 times. ). Pertinent negatives include no chills, flank pain, hematuria or urgency. - drinks cup of coffee in evening. No Known Allergies    Current Outpatient Medications on File Prior to Visit   Medication Sig Dispense Refill    amLODIPine (NORVASC) 5 MG tablet 1 daily 90 tablet 1    atorvastatin (LIPITOR) 10 MG tablet TAKE ONE TABLET BY MOUTH EVERY MORNING 90 tablet 1    oxybutynin (DITROPAN-XL) 5 MG extended release tablet Take 5 mg by mouth daily      fluticasone (FLONASE) 50 MCG/ACT nasal spray 2 sprays by Nasal route daily 1 Bottle 0    Multiple Vitamins-Minerals (THERAPEUTIC MULTIVITAMIN-MINERALS) tablet Take 1 tablet by mouth daily Ld 06/07       No current facility-administered medications on file prior to visit. Review of Systems   Constitutional:  Negative for chills and fever. Gastrointestinal:  Negative for abdominal pain. Endocrine: Positive for polydipsia (hs). Genitourinary:  Positive for frequency (hs 1-2 times. ). Negative for difficulty urinating, dysuria, flank pain, hematuria and urgency.         No leakage at all now since on Merbetriq.  other review of systems reviewed and are negative    OBJECTIVE:  /82   Pulse 84   Temp 97.5 °F (36.4 °C)   Wt 174 lb (78.9 kg)   LMP 05/08/2018   SpO2 96%   BMI 27.25 kg/m²      Physical Exam   2/28/23 15:12 Protein, UA neg   Color, UA dark yellow   Clarity, UA cloudy   Glucose, UA POC neg   Bilirubin, UA neg   Ketones, UA neg   Spec Grav, UA 1.003   pH, UA 5.5   Urobilinogen, UA 0.2   Nitrite, UA neg   Leukocytes, UA tr   Blood, UA POC tr-intact     ASSESSMENT AND PLAN:    Bladimir Hunt was seen today for urinary tract infection. Diagnoses and all orders for this visit:    Urinary frequency  -     POCT Urinalysis no Micro    Nocturia  -     mirabegron (MYRBETRIQ) 25 MG TB24; Take 1 tablet by mouth daily    - no caffeine products after 8 pm.  - continue current dose Myrbetriq    Return if symptoms worsen or fail to improve.     Karyn Valenzuela, DO

## 2023-07-21 ENCOUNTER — OFFICE VISIT (OUTPATIENT)
Dept: PRIMARY CARE CLINIC | Age: 53
End: 2023-07-21
Payer: COMMERCIAL

## 2023-07-21 VITALS
HEART RATE: 71 BPM | WEIGHT: 171 LBS | HEIGHT: 67 IN | OXYGEN SATURATION: 97 % | DIASTOLIC BLOOD PRESSURE: 80 MMHG | SYSTOLIC BLOOD PRESSURE: 126 MMHG | BODY MASS INDEX: 26.84 KG/M2 | TEMPERATURE: 97.2 F

## 2023-07-21 DIAGNOSIS — E55.9 VITAMIN D DEFICIENCY: ICD-10-CM

## 2023-07-21 DIAGNOSIS — Z00.00 ANNUAL PHYSICAL EXAM: ICD-10-CM

## 2023-07-21 DIAGNOSIS — Z00.00 ANNUAL PHYSICAL EXAM: Primary | ICD-10-CM

## 2023-07-21 LAB
ALBUMIN SERPL-MCNC: 5 G/DL (ref 3.5–5.2)
ALP BLD-CCNC: 76 U/L (ref 35–104)
ALT SERPL-CCNC: 12 U/L (ref 0–32)
ANION GAP SERPL CALCULATED.3IONS-SCNC: 11 MMOL/L (ref 7–16)
AST SERPL-CCNC: 18 U/L (ref 0–31)
BILIRUB SERPL-MCNC: 0.6 MG/DL (ref 0–1.2)
BUN BLDV-MCNC: 17 MG/DL (ref 6–20)
CALCIUM SERPL-MCNC: 9.7 MG/DL (ref 8.6–10.2)
CHLORIDE BLD-SCNC: 105 MMOL/L (ref 98–107)
CHOLESTEROL: 185 MG/DL
CO2: 26 MMOL/L (ref 22–29)
CREAT SERPL-MCNC: 0.8 MG/DL (ref 0.5–1)
GFR SERPL CREATININE-BSD FRML MDRD: >60 ML/MIN/1.73M2
GLUCOSE BLD-MCNC: 90 MG/DL (ref 74–99)
HCT VFR BLD CALC: 49.1 % (ref 34–48)
HDLC SERPL-MCNC: 52 MG/DL
HEMOGLOBIN: 15.8 G/DL (ref 11.5–15.5)
LDL CHOLESTEROL: 114 MG/DL
MCH RBC QN AUTO: 29.6 PG (ref 26–35)
MCHC RBC AUTO-ENTMCNC: 32.2 G/DL (ref 32–34.5)
MCV RBC AUTO: 92.1 FL (ref 80–99.9)
PDW BLD-RTO: 12.4 % (ref 11.5–15)
PLATELET # BLD: 309 K/UL (ref 130–450)
PMV BLD AUTO: 11.2 FL (ref 7–12)
POTASSIUM SERPL-SCNC: 5.1 MMOL/L (ref 3.5–5)
RBC # BLD: 5.33 M/UL (ref 3.5–5.5)
SODIUM BLD-SCNC: 142 MMOL/L (ref 132–146)
TOTAL PROTEIN: 7.3 G/DL (ref 6.4–8.3)
TRIGL SERPL-MCNC: 96 MG/DL
VITAMIN D 25-HYDROXY: 33.3 NG/ML (ref 30–100)
VLDLC SERPL CALC-MCNC: 19 MG/DL
WBC # BLD: 7.5 K/UL (ref 4.5–11.5)

## 2023-07-21 PROCEDURE — 99396 PREV VISIT EST AGE 40-64: CPT | Performed by: FAMILY MEDICINE

## 2023-07-21 PROCEDURE — 3074F SYST BP LT 130 MM HG: CPT | Performed by: FAMILY MEDICINE

## 2023-07-21 PROCEDURE — 3079F DIAST BP 80-89 MM HG: CPT | Performed by: FAMILY MEDICINE

## 2023-07-21 RX ORDER — ATORVASTATIN CALCIUM 10 MG/1
10 TABLET, FILM COATED ORAL EVERY MORNING
Qty: 90 TABLET | Refills: 1 | Status: SHIPPED | OUTPATIENT
Start: 2023-07-21

## 2023-07-21 RX ORDER — AMLODIPINE BESYLATE 5 MG/1
TABLET ORAL
Qty: 90 TABLET | Refills: 1 | Status: SHIPPED | OUTPATIENT
Start: 2023-07-21

## 2023-07-21 SDOH — ECONOMIC STABILITY: FOOD INSECURITY: WITHIN THE PAST 12 MONTHS, THE FOOD YOU BOUGHT JUST DIDN'T LAST AND YOU DIDN'T HAVE MONEY TO GET MORE.: NEVER TRUE

## 2023-07-21 SDOH — ECONOMIC STABILITY: HOUSING INSECURITY
IN THE LAST 12 MONTHS, WAS THERE A TIME WHEN YOU DID NOT HAVE A STEADY PLACE TO SLEEP OR SLEPT IN A SHELTER (INCLUDING NOW)?: NO

## 2023-07-21 SDOH — ECONOMIC STABILITY: FOOD INSECURITY: WITHIN THE PAST 12 MONTHS, YOU WORRIED THAT YOUR FOOD WOULD RUN OUT BEFORE YOU GOT MONEY TO BUY MORE.: NEVER TRUE

## 2023-07-21 SDOH — ECONOMIC STABILITY: INCOME INSECURITY: HOW HARD IS IT FOR YOU TO PAY FOR THE VERY BASICS LIKE FOOD, HOUSING, MEDICAL CARE, AND HEATING?: NOT HARD AT ALL

## 2023-07-21 NOTE — PROGRESS NOTES
Mucosa normal. No drainage  Oropharynx: Oropharynx clear with no exudate seen  Neck: Neck supple. No jugular venous distension, lymphadenopathy or thyromegaly Trachea midline. No carotid bruits  Lungs: Lungs clear to auscultation bilaterally. No rhonchi, crackles or wheezes  Heart: S1 S2  Regular rate and rhythm. No rub, murmur or gallop  Abdomen: Abdomen soft, non-tender. BS normal. No masses, organomegaly  Extremities: No edema, Peripheral pulses palpable  Musculoskeletal: Muscular strength appears intact. No joint effusion, tenderness, swelling or warmth  Neuro:  No focal motor or sensory deficits. 2+/4 L4 reflexes        ASSESSMENT/PLAN:  Bean Walsh was seen today for annual exam.    Diagnoses and all orders for this visit:    Annual physical exam  -     Lipid Panel; Future  -     Cancel: Glucose, Fasting; Future  -     CBC; Future  -     Comprehensive Metabolic Panel; Future    Vitamin D deficiency  -     Vitamin D 25 Hydroxy; Future    Other orders  -     atorvastatin (LIPITOR) 10 MG tablet; Take 1 tablet by mouth every morning  -     amLODIPine (NORVASC) 5 MG tablet; 1 daily     - bp stable on med  - continue Vit D otc     Return in about 6 months (around 1/21/2024) for or for acute problem.     Skip Arrant Economus, DO

## 2023-07-24 LAB — GLUCOSE FASTING: 90 MG/DL (ref 74–99)

## 2023-09-20 ENCOUNTER — OFFICE VISIT (OUTPATIENT)
Dept: SURGERY | Age: 53
End: 2023-09-20
Payer: COMMERCIAL

## 2023-09-20 VITALS
TEMPERATURE: 98 F | DIASTOLIC BLOOD PRESSURE: 86 MMHG | WEIGHT: 175 LBS | BODY MASS INDEX: 28.12 KG/M2 | HEIGHT: 66 IN | OXYGEN SATURATION: 94 % | HEART RATE: 84 BPM | SYSTOLIC BLOOD PRESSURE: 137 MMHG

## 2023-09-20 DIAGNOSIS — R22.9 MASS OF SKIN: Primary | ICD-10-CM

## 2023-09-20 PROCEDURE — 3074F SYST BP LT 130 MM HG: CPT | Performed by: PLASTIC SURGERY

## 2023-09-20 PROCEDURE — 3078F DIAST BP <80 MM HG: CPT | Performed by: PLASTIC SURGERY

## 2023-09-20 PROCEDURE — 99202 OFFICE O/P NEW SF 15 MIN: CPT | Performed by: PLASTIC SURGERY

## 2023-09-20 NOTE — PROGRESS NOTES
Department of Plastic Surgery - Adult  Attending Consult Note      CHIEF COMPLAINT:   Mass of left scalp    History Obtained From:  patient    HISTORY OF PRESENT ILLNESS:                The patient is a 52 y.o. female who presents with left lateral scalp subcutaneous mass. The patient states that she was referred to our office by her dermatologist for this mass. She states she has had it for several years and has had masses like this in the past to her scalp. She states she is never had this area biopsied or excised in the past she denies any recent discharge drainage or bleeding however she states the lesion is becoming more tender. Past Medical History:    Past Medical History:   Diagnosis Date    Colon polyp 06/10/2021    Tubular adenoma .   Dr. Veronica Hale    COVID-19 virus infection 02/20/2023    Hyperlipidemia     Hypertension     Seasonal allergies      Past Surgical History:    Past Surgical History:   Procedure Laterality Date    BREAST REDUCTION SURGERY Bilateral 12/16/2020    BILATERAL BREAST REDUCTION WITH POSSIBLE FREE NIPPLE GRAFTS, POSSIBLE LIPOSUCTION BILATERAL AXILLA performed by Kristina Duque MD at [facility]. Right 06/10/2020    RIGHT CARPAL TUNNEL RELEASE ENDOSCOPIC performed by Jorge Mitchell MD at [facility]    COLONOSCOPY N/A 6/10/2021    COLONOSCOPY WITH BIOPSY performed by Meli Huynh DO at [facility] Right 06/10/2020    RIGHT THUMB FINGER TRIGGER RELEASE performed by Jorge Mitchlel MD at [address] [place] [address]       Current Medications:      Current Outpatient Medications   Medication Sig Dispense Refill    atorvastatin (LIPITOR) 10 MG tablet Take 1 tablet by mouth every morning 90 tablet 1    amLODIPine (NORVASC) 5 MG tablet 1 daily 90 tablet 1    mirabegron (MYRBETRIQ) 25 MG TB24 Take 1 tablet by mouth daily 90 tablet 1    fluticasone (FLONASE) 50 MCG/ACT nasal spray 2 sprays by Nasal route daily

## 2023-10-03 ENCOUNTER — TELEPHONE (OUTPATIENT)
Dept: SURGERY | Age: 53
End: 2023-10-03

## 2023-10-03 NOTE — TELEPHONE ENCOUNTER
Called PT on 10/03/23 to see if she would be able to schedule her procedure on 10/09/23 @ 8:15, 8:30, or 9:30

## 2024-01-02 NOTE — PROGRESS NOTES
Subjective:    Follow up today for excisional biopsy left scalp subcutaneous mass. Denies fever, nausea, vomiting, leg pain or swelling.  The patient voices understanding of the procedure they are having today and would like to proceed. Patient states that the mass has been painful and growing.    Objective:    LMP 05/08/2018       left scalp subcutaneous mass  Lesion- 20mm x 20mm  Margin- 0mm  Defect- 20mm x 10mm  Scar-20mm         Assessment:    Patient Active Problem List   Diagnosis    Chest pain    Essential hypertension    Dyslipidemia    Non morbid obesity due to excess calories    Vitamin D deficiency    Plantar fasciitis    Pain in right foot    Calcaneal spur of foot, right    Adenomatous polyp of colon       Plan:       Diagnosis  -) left scalp subcutaneous mass  -) Pain    -The risks, benefits and options were discussed with the pt. The risks included but not limited to pain, bleeding, infection, heavy scarring, damage to surrounding structures, fluid collections, asymmetry, and need for further procedures. All of Her questions were answered to their satisfaction and She agrees to proceed with the procedure.      -After consent was obtained, the area was cleansed with an alcohol swab. Local anesthesia consisting of 1% lidocaine with 1:100,000 epinepherine was injected  surrounding the area. The local was allowed to work.  Using a 10-blade the left scalp subcutaneous mass was excised,  Intermediate  repair was performed.  The wound(s) were closed with 5-0 Monocryl and 5-0 fast absorbing gut suture , hemostasis was obtained and a dressing was placed over the wound.  The procedure was performed by Dr Renan Salcido    Please schedule an appointment to be seen on Follow-up with our office in 7-14 days  Diet: regular diet  Activity: no heavy lifting for 7 days.   Shower/Bathing: OK to shower over the wound site in 48 hours.  No baths, hot tubs or soaking of the wound site at this time.  Pt voices understanding.

## 2024-01-14 DIAGNOSIS — R35.1 NOCTURIA: ICD-10-CM

## 2024-01-15 RX ORDER — MIRABEGRON 25 MG/1
25 TABLET, FILM COATED, EXTENDED RELEASE ORAL DAILY
Qty: 90 TABLET | Refills: 1 | Status: SHIPPED | OUTPATIENT
Start: 2024-01-15

## 2024-01-17 ENCOUNTER — PROCEDURE VISIT (OUTPATIENT)
Dept: SURGERY | Age: 54
End: 2024-01-17

## 2024-01-17 VITALS — TEMPERATURE: 98.5 F

## 2024-01-17 DIAGNOSIS — R22.9 MASS OF SKIN: Primary | ICD-10-CM

## 2024-01-17 RX ORDER — LIDOCAINE HYDROCHLORIDE AND EPINEPHRINE BITARTRATE 20; .01 MG/ML; MG/ML
1 INJECTION, SOLUTION SUBCUTANEOUS ONCE
Status: COMPLETED | OUTPATIENT
Start: 2024-01-17 | End: 2024-01-17

## 2024-01-17 RX ADMIN — LIDOCAINE HYDROCHLORIDE AND EPINEPHRINE BITARTRATE 1 ML: 20; .01 INJECTION, SOLUTION SUBCUTANEOUS at 16:02

## 2024-01-22 LAB — SURGICAL PATHOLOGY REPORT: NORMAL

## 2024-01-23 ENCOUNTER — OFFICE VISIT (OUTPATIENT)
Dept: PRIMARY CARE CLINIC | Age: 54
End: 2024-01-23
Payer: COMMERCIAL

## 2024-01-23 VITALS
HEART RATE: 80 BPM | OXYGEN SATURATION: 98 % | DIASTOLIC BLOOD PRESSURE: 82 MMHG | WEIGHT: 177 LBS | SYSTOLIC BLOOD PRESSURE: 118 MMHG | BODY MASS INDEX: 28.57 KG/M2 | TEMPERATURE: 97.2 F

## 2024-01-23 DIAGNOSIS — R35.0 URINARY FREQUENCY: ICD-10-CM

## 2024-01-23 DIAGNOSIS — E78.5 DYSLIPIDEMIA: ICD-10-CM

## 2024-01-23 DIAGNOSIS — I10 ESSENTIAL HYPERTENSION: ICD-10-CM

## 2024-01-23 DIAGNOSIS — R33.9 URINARY RETENTION: ICD-10-CM

## 2024-01-23 DIAGNOSIS — I10 ESSENTIAL HYPERTENSION: Primary | ICD-10-CM

## 2024-01-23 LAB
ALBUMIN SERPL-MCNC: 4.9 G/DL (ref 3.5–5.2)
ALP BLD-CCNC: 72 U/L (ref 35–104)
ALT SERPL-CCNC: 13 U/L (ref 0–32)
ANION GAP SERPL CALCULATED.3IONS-SCNC: 16 MMOL/L (ref 7–16)
AST SERPL-CCNC: 16 U/L (ref 0–31)
BILIRUB SERPL-MCNC: 0.5 MG/DL (ref 0–1.2)
BUN BLDV-MCNC: 14 MG/DL (ref 6–20)
CALCIUM SERPL-MCNC: 9.7 MG/DL (ref 8.6–10.2)
CHLORIDE BLD-SCNC: 104 MMOL/L (ref 98–107)
CHOLESTEROL: 196 MG/DL
CO2: 23 MMOL/L (ref 22–29)
CREAT SERPL-MCNC: 0.8 MG/DL (ref 0.5–1)
GFR SERPL CREATININE-BSD FRML MDRD: >60 ML/MIN/1.73M2
GLUCOSE BLD-MCNC: 87 MG/DL (ref 74–99)
HCT VFR BLD CALC: 47.1 % (ref 34–48)
HDLC SERPL-MCNC: 54 MG/DL
HEMOGLOBIN: 15.9 G/DL (ref 11.5–15.5)
LDL CHOLESTEROL: 121 MG/DL
MCH RBC QN AUTO: 30.3 PG (ref 26–35)
MCHC RBC AUTO-ENTMCNC: 33.8 G/DL (ref 32–34.5)
MCV RBC AUTO: 89.7 FL (ref 80–99.9)
PDW BLD-RTO: 12.1 % (ref 11.5–15)
PLATELET # BLD: 302 K/UL (ref 130–450)
PMV BLD AUTO: 11.3 FL (ref 7–12)
POTASSIUM SERPL-SCNC: 4.4 MMOL/L (ref 3.5–5)
RBC # BLD: 5.25 M/UL (ref 3.5–5.5)
SODIUM BLD-SCNC: 143 MMOL/L (ref 132–146)
TOTAL PROTEIN: 7.3 G/DL (ref 6.4–8.3)
TRIGL SERPL-MCNC: 106 MG/DL
VLDLC SERPL CALC-MCNC: 21 MG/DL
WBC # BLD: 9.8 K/UL (ref 4.5–11.5)

## 2024-01-23 PROCEDURE — 3079F DIAST BP 80-89 MM HG: CPT | Performed by: FAMILY MEDICINE

## 2024-01-23 PROCEDURE — 3074F SYST BP LT 130 MM HG: CPT | Performed by: FAMILY MEDICINE

## 2024-01-23 PROCEDURE — 99214 OFFICE O/P EST MOD 30 MIN: CPT | Performed by: FAMILY MEDICINE

## 2024-01-23 RX ORDER — AMLODIPINE BESYLATE 5 MG/1
TABLET ORAL
Qty: 90 TABLET | Refills: 1 | Status: SHIPPED | OUTPATIENT
Start: 2024-01-23

## 2024-01-23 RX ORDER — ATORVASTATIN CALCIUM 10 MG/1
10 TABLET, FILM COATED ORAL EVERY MORNING
Qty: 90 TABLET | Refills: 1 | Status: SHIPPED
Start: 2024-01-23 | End: 2024-01-24

## 2024-01-23 ASSESSMENT — ENCOUNTER SYMPTOMS: SHORTNESS OF BREATH: 0

## 2024-01-23 ASSESSMENT — PATIENT HEALTH QUESTIONNAIRE - PHQ9
2. FEELING DOWN, DEPRESSED OR HOPELESS: 0
SUM OF ALL RESPONSES TO PHQ9 QUESTIONS 1 & 2: 0
SUM OF ALL RESPONSES TO PHQ QUESTIONS 1-9: 0
SUM OF ALL RESPONSES TO PHQ QUESTIONS 1-9: 0
1. LITTLE INTEREST OR PLEASURE IN DOING THINGS: 0
SUM OF ALL RESPONSES TO PHQ QUESTIONS 1-9: 0
SUM OF ALL RESPONSES TO PHQ QUESTIONS 1-9: 0

## 2024-01-23 NOTE — PROGRESS NOTES
Abla Kaur, a female of 53 y.o. came to the office 1/23/2024.     Patient Active Problem List   Diagnosis    Chest pain    Essential hypertension    Dyslipidemia    Non morbid obesity due to excess calories    Vitamin D deficiency    Plantar fasciitis    Pain in right foot    Calcaneal spur of foot, right    Adenomatous polyp of colon          Hypertension  This is a chronic problem. The current episode started more than 1 year ago. The problem has been rapidly worsening since onset. Pertinent negatives include no chest pain, headaches, palpitations, peripheral edema or shortness of breath. Past treatments include calcium channel blockers. There are no compliance problems.    Urinary Frequency   This is a chronic problem. She is Sexually active. Associated symptoms include frequency. Pertinent negatives include no hematuria, hesitancy or urgency. Associated symptoms comments: When urinates then 1 hr later has to urinate again and not much comes out. Denies retention at after first urination. Not sure if Myrbetriq is helping now. No incontinence.   Hyperlipidemia  This is a chronic problem. The current episode started more than 1 year ago. The problem is controlled. Pertinent negatives include no chest pain, leg pain, myalgias or shortness of breath. Current antihyperlipidemic treatment includes statins. The current treatment provides moderate improvement of lipids. There are no compliance problems.         No Known Allergies    Current Outpatient Medications on File Prior to Visit   Medication Sig Dispense Refill    fluticasone (FLONASE) 50 MCG/ACT nasal spray 2 sprays by Nasal route daily (Patient taking differently: 2 sprays by Nasal route as needed for Allergies) 1 Bottle 0    Multiple Vitamins-Minerals (THERAPEUTIC MULTIVITAMIN-MINERALS) tablet Take 1 tablet by mouth daily Ld 06/07       No current facility-administered medications on file prior to visit.       Review of Systems   Respiratory:

## 2024-01-24 DIAGNOSIS — E78.5 DYSLIPIDEMIA: ICD-10-CM

## 2024-01-24 RX ORDER — ATORVASTATIN CALCIUM 20 MG/1
20 TABLET, FILM COATED ORAL EVERY MORNING
Qty: 90 TABLET | Refills: 1 | Status: SHIPPED | OUTPATIENT
Start: 2024-01-24

## 2024-01-29 ENCOUNTER — OFFICE VISIT (OUTPATIENT)
Dept: SURGERY | Age: 54
End: 2024-01-29

## 2024-01-29 VITALS — TEMPERATURE: 97.3 F

## 2024-01-29 DIAGNOSIS — R22.9 MASS OF SKIN: Primary | ICD-10-CM

## 2024-01-29 PROCEDURE — 99024 POSTOP FOLLOW-UP VISIT: CPT | Performed by: PHYSICIAN ASSISTANT

## 2024-01-29 NOTE — PROGRESS NOTES
Subjective:    Follow up today from excisional biopsy scalp subcutaneous mass. Denies fever, nausea, vomiting, leg pain or swelling, pain is absent.  The pt states that they have  kept the wound site(s) covered as directed.    They state that their pain is absent.    Objective:    LMP 05/08/2018     Scalp  Wound: Clean, dry, and intact, no drainage.  No signs of infection, wound healing well.   Neuro- Sensation  intact to emliy incisional site with light touch . Cranial nerves II-XII grossly intact.     Assessment:    Patient Active Problem List   Diagnosis    Chest pain    Essential hypertension    Dyslipidemia    Non morbid obesity due to excess calories    Vitamin D deficiency    Plantar fasciitis    Pain in right foot    Calcaneal spur of foot, right    Adenomatous polyp of colon       Labs: CBC:   Lab Results   Component Value Date/Time    WBC 9.8 01/23/2024 09:57 AM    RBC 5.25 01/23/2024 09:57 AM    HGB 15.9 01/23/2024 09:57 AM    HCT 47.1 01/23/2024 09:57 AM    MCV 89.7 01/23/2024 09:57 AM    MCH 30.3 01/23/2024 09:57 AM    MCHC 33.8 01/23/2024 09:57 AM    RDW 12.1 01/23/2024 09:57 AM     01/23/2024 09:57 AM    MPV 11.3 01/23/2024 09:57 AM     BMP:    Lab Results   Component Value Date/Time     01/23/2024 09:57 AM    K 4.4 01/23/2024 09:57 AM     01/23/2024 09:57 AM    CO2 23 01/23/2024 09:57 AM    BUN 14 01/23/2024 09:57 AM    LABALBU 4.9 01/23/2024 09:57 AM    CREATININE 0.8 01/23/2024 09:57 AM    CALCIUM 9.7 01/23/2024 09:57 AM    GFRAA >60 06/03/2016 06:40 AM    LABGLOM >60 01/23/2024 09:57 AM    GLUCOSE 87 01/23/2024 09:57 AM         Pathology Report- Path Number: [id number]     -- Diagnosis --   Soft tissue of scalp, excision: Pilar (trichilemmal) cyst.       Antonio Amaya MD   **Electronically Signed Out**         rcb/1/22/2024      Plan:     Status Post : Colonoscopy With Biopsy 6/10/2021      Educated the pt on their pathology report.  Pt voices understanding      Dressing

## 2024-02-01 ENCOUNTER — HOSPITAL ENCOUNTER (OUTPATIENT)
Dept: GENERAL RADIOLOGY | Age: 54
Discharge: HOME | End: 2024-02-03
Payer: COMMERCIAL

## 2024-02-01 DIAGNOSIS — Z12.31 VISIT FOR SCREENING MAMMOGRAM: ICD-10-CM

## 2024-02-01 PROCEDURE — 77063 BREAST TOMOSYNTHESIS BI: CPT

## 2024-02-02 ENCOUNTER — CLINICAL DOCUMENTATION (OUTPATIENT)
Dept: GENERAL RADIOLOGY | Age: 54
End: 2024-02-02

## 2024-02-02 NOTE — PROGRESS NOTES
Mammogram clinical report provided to patient. Report sent to Dr. Cali Valenzuela as per patient's request.

## 2024-06-24 ENCOUNTER — PATIENT MESSAGE (OUTPATIENT)
Dept: PRIMARY CARE CLINIC | Age: 54
End: 2024-06-24

## 2024-06-24 DIAGNOSIS — R35.0 URINARY FREQUENCY: Primary | ICD-10-CM

## 2024-06-24 NOTE — TELEPHONE ENCOUNTER
From: Alba Kaur  To: Dr. Irving Valenzuela  Sent: 6/24/2024 1:53 PM EDT  Subject: Urine test    Hi Dr Valenzuela! I scheduled an appointment for this Thursday because I believe I possibly have a UTI and was wonder If in any way I could get the urine test before Thursday? I’m very uncomfortable and was hoping I didn’t have to wait that long. Thanks!

## 2024-06-25 DIAGNOSIS — R35.0 URINARY FREQUENCY: ICD-10-CM

## 2024-06-25 LAB
BACTERIA: ABNORMAL
BILIRUBIN, URINE: NEGATIVE
COLOR, UA: YELLOW
GLUCOSE URINE: NEGATIVE MG/DL
KETONES, URINE: NEGATIVE MG/DL
LEUKOCYTE ESTERASE, URINE: ABNORMAL
NITRITE, URINE: NEGATIVE
PH, URINE: 6 (ref 5–9)
PROTEIN UA: 30 MG/DL
RBC UA: ABNORMAL /HPF
SPECIFIC GRAVITY UA: 1.02 (ref 1–1.03)
TURBIDITY: CLEAR
URINE HGB: ABNORMAL
UROBILINOGEN, URINE: 1 EU/DL (ref 0–1)
WBC UA: ABNORMAL /HPF

## 2024-06-26 ENCOUNTER — TELEPHONE (OUTPATIENT)
Dept: PRIMARY CARE CLINIC | Age: 54
End: 2024-06-26

## 2024-06-26 RX ORDER — SULFAMETHOXAZOLE AND TRIMETHOPRIM 800; 160 MG/1; MG/1
1 TABLET ORAL 2 TIMES DAILY
Qty: 10 TABLET | Refills: 0 | Status: SHIPPED | OUTPATIENT
Start: 2024-06-26 | End: 2024-07-01

## 2024-06-26 NOTE — TELEPHONE ENCOUNTER
Winsome urine results are in she wants to know iif you will call something in so she can get started on it . Phillip millan

## 2024-06-27 ENCOUNTER — OFFICE VISIT (OUTPATIENT)
Dept: PRIMARY CARE CLINIC | Age: 54
End: 2024-06-27
Payer: COMMERCIAL

## 2024-06-27 VITALS
TEMPERATURE: 98 F | SYSTOLIC BLOOD PRESSURE: 110 MMHG | DIASTOLIC BLOOD PRESSURE: 80 MMHG | HEART RATE: 100 BPM | WEIGHT: 183 LBS | OXYGEN SATURATION: 99 % | BODY MASS INDEX: 29.54 KG/M2

## 2024-06-27 DIAGNOSIS — R35.0 URINARY FREQUENCY: ICD-10-CM

## 2024-06-27 DIAGNOSIS — N30.00 ACUTE CYSTITIS WITHOUT HEMATURIA: Primary | ICD-10-CM

## 2024-06-27 PROBLEM — M79.671 PAIN IN RIGHT FOOT: Status: RESOLVED | Noted: 2019-07-17 | Resolved: 2024-06-27

## 2024-06-27 PROCEDURE — 3079F DIAST BP 80-89 MM HG: CPT | Performed by: FAMILY MEDICINE

## 2024-06-27 PROCEDURE — 3074F SYST BP LT 130 MM HG: CPT | Performed by: FAMILY MEDICINE

## 2024-06-27 PROCEDURE — 99213 OFFICE O/P EST LOW 20 MIN: CPT | Performed by: FAMILY MEDICINE

## 2024-06-27 NOTE — PROGRESS NOTES
Alba Kaur, a female of 53 y.o. came to the office 6/27/2024.     Patient Active Problem List   Diagnosis    Essential hypertension    Dyslipidemia    Non morbid obesity due to excess calories    Vitamin D deficiency    Plantar fasciitis    Calcaneal spur of foot, right    Adenomatous polyp of colon          Urinary Tract Infection  This is a new problem. The current episode started in the past 7 days (6/21 began with urgency but couldn't go. retention sens as well.). The problem has been gradually improving (since started Bactrim last pm.) since onset. Pertinent negatives include no hematuria or pain.        No Known Allergies    Current Outpatient Medications on File Prior to Visit   Medication Sig Dispense Refill    sulfamethoxazole-trimethoprim (BACTRIM DS) 800-160 MG per tablet Take 1 tablet by mouth 2 times daily for 5 days 10 tablet 0    atorvastatin (LIPITOR) 20 MG tablet Take 1 tablet by mouth every morning 90 tablet 1    amLODIPine (NORVASC) 5 MG tablet 1 daily 90 tablet 1    mirabegron (MYRBETRIQ) 50 MG TB24 Take 50 mg by mouth daily 30 tablet 2    fluticasone (FLONASE) 50 MCG/ACT nasal spray 2 sprays by Nasal route daily (Patient taking differently: 2 sprays by Nasal route as needed for Allergies) 1 Bottle 0     No current facility-administered medications on file prior to visit.       Review of Systems   Constitutional:  Negative for chills and fever.   Genitourinary:  Positive for frequency (when off Myrbetriq. no leakage when on as well.) and urgency. Negative for dysuria and hematuria.        Cloudy urine     other review of systems reviewed and are negative    OBJECTIVE:  /80   Pulse 100   Temp 98 °F (36.7 °C)   Wt 83 kg (183 lb)   LMP 05/08/2018   SpO2 99%   BMI 29.54 kg/m²      Physical Exam  Vitals reviewed.   Eyes:      General: No scleral icterus.     Conjunctiva/sclera: Conjunctivae normal.   Neck:      Thyroid: No thyromegaly.   Cardiovascular:      Rate and Rhythm:

## 2024-07-23 LAB
CHOLEST SERPL-MCNC: 197 MG/DL
GLUCOSE SERPL-MCNC: 97 MG/DL (ref 74–99)
HDLC SERPL-MCNC: 52 MG/DL
LDLC SERPL CALC-MCNC: 116 MG/DL
PATIENT FASTING?: YES
TRIGL SERPL-MCNC: 144 MG/DL
VLDLC SERPL CALC-MCNC: 29 MG/DL

## 2024-08-05 ENCOUNTER — OFFICE VISIT (OUTPATIENT)
Dept: PRIMARY CARE CLINIC | Age: 54
End: 2024-08-05
Payer: COMMERCIAL

## 2024-08-05 VITALS
SYSTOLIC BLOOD PRESSURE: 128 MMHG | TEMPERATURE: 97.5 F | DIASTOLIC BLOOD PRESSURE: 82 MMHG | BODY MASS INDEX: 29.38 KG/M2 | OXYGEN SATURATION: 98 % | HEART RATE: 88 BPM | WEIGHT: 182 LBS

## 2024-08-05 DIAGNOSIS — E78.5 DYSLIPIDEMIA: ICD-10-CM

## 2024-08-05 DIAGNOSIS — I10 ESSENTIAL HYPERTENSION: ICD-10-CM

## 2024-08-05 DIAGNOSIS — Z00.00 ANNUAL PHYSICAL EXAM: Primary | ICD-10-CM

## 2024-08-05 PROCEDURE — 99396 PREV VISIT EST AGE 40-64: CPT | Performed by: FAMILY MEDICINE

## 2024-08-05 PROCEDURE — 3074F SYST BP LT 130 MM HG: CPT | Performed by: FAMILY MEDICINE

## 2024-08-05 PROCEDURE — 3079F DIAST BP 80-89 MM HG: CPT | Performed by: FAMILY MEDICINE

## 2024-08-05 RX ORDER — ATORVASTATIN CALCIUM 20 MG/1
20 TABLET, FILM COATED ORAL EVERY MORNING
Qty: 90 TABLET | Refills: 1 | Status: SHIPPED | OUTPATIENT
Start: 2024-08-05

## 2024-08-05 RX ORDER — MIRABEGRON 25 MG/1
TABLET, FILM COATED, EXTENDED RELEASE ORAL
COMMUNITY
Start: 2024-06-03 | End: 2024-08-05 | Stop reason: SDUPTHER

## 2024-08-05 RX ORDER — MIRABEGRON 25 MG/1
TABLET, FILM COATED, EXTENDED RELEASE ORAL
Qty: 90 TABLET | Refills: 1 | Status: SHIPPED | OUTPATIENT
Start: 2024-08-05

## 2024-08-05 RX ORDER — AMLODIPINE BESYLATE 5 MG/1
TABLET ORAL
Qty: 90 TABLET | Refills: 1 | Status: SHIPPED | OUTPATIENT
Start: 2024-08-05

## 2024-08-05 SDOH — ECONOMIC STABILITY: FOOD INSECURITY: WITHIN THE PAST 12 MONTHS, YOU WORRIED THAT YOUR FOOD WOULD RUN OUT BEFORE YOU GOT MONEY TO BUY MORE.: NEVER TRUE

## 2024-08-05 SDOH — ECONOMIC STABILITY: FOOD INSECURITY: WITHIN THE PAST 12 MONTHS, THE FOOD YOU BOUGHT JUST DIDN'T LAST AND YOU DIDN'T HAVE MONEY TO GET MORE.: NEVER TRUE

## 2024-08-05 SDOH — ECONOMIC STABILITY: INCOME INSECURITY: HOW HARD IS IT FOR YOU TO PAY FOR THE VERY BASICS LIKE FOOD, HOUSING, MEDICAL CARE, AND HEATING?: NOT HARD AT ALL

## 2024-08-05 NOTE — PROGRESS NOTES
SUBJECTIVE:    HPI: Alba Kaur  Was seen here today for   Chief Complaint   Patient presents with    Annual Exam    .  She states they are dealing with no issues.     Past Medical History:   Diagnosis Date    Colon polyp 06/10/2021    Tubular adenoma .  Dr. Phan    COVID-19 virus infection 2023    Hyperlipidemia     Hypertension     Seasonal allergies        Past Surgical History:   Procedure Laterality Date    BREAST REDUCTION SURGERY Bilateral 2020    BILATERAL BREAST REDUCTION WITH POSSIBLE FREE NIPPLE GRAFTS, POSSIBLE LIPOSUCTION BILATERAL AXILLA performed by Don Millard MD at [facility] [place]    CARPAL TUNNEL RELEASE Right 06/10/2020    RIGHT CARPAL TUNNEL RELEASE ENDOSCOPIC performed by Alec Spivey MD at [facility] OR     SECTION      COLONOSCOPY N/A 6/10/2021    COLONOSCOPY WITH BIOPSY performed by Gary Phan DO at [facility] ENDOSCOPY    FINGER TRIGGER RELEASE Right 06/10/2020    RIGHT THUMB FINGER TRIGGER RELEASE performed by Alec Spivey MD at [facility] OR    TUBAL LIGATION         Family History   Problem Relation Age of Onset    Heart Surgery Mother     Heart Attack Mother 65    Diabetes Mother     Other Father         adrenal failure due to long term steroid use    Breast Cancer Maternal Grandmother 70       Prior to Admission medications    Medication Sig Start Date End Date Taking? Authorizing Provider   atorvastatin (LIPITOR) 20 MG tablet Take 1 tablet by mouth every morning 24  Yes Irving Valenzuela DO   amLODIPine (NORVASC) 5 MG tablet 1 daily 24  Yes Irving Valenzuela DO   mirabegron (MYRBETRIQ) 25 MG TB24 One a day 24  Yes Irving Valenzuela DO   fluticasone (FLONASE) 50 MCG/ACT nasal spray 2 sprays by Nasal route daily  Patient taking differently: 2 sprays by Nasal route as needed for Allergies 21   Irving Valenzuela DO       Patient has no known allergies.    Social History     Tobacco Use    Smoking status: Some Days

## 2025-03-04 DIAGNOSIS — I10 ESSENTIAL HYPERTENSION: ICD-10-CM

## 2025-03-04 RX ORDER — AMLODIPINE BESYLATE 5 MG/1
TABLET ORAL
Qty: 90 TABLET | Refills: 1 | Status: SHIPPED | OUTPATIENT
Start: 2025-03-04

## 2025-03-05 DIAGNOSIS — E78.5 DYSLIPIDEMIA: ICD-10-CM

## 2025-03-05 RX ORDER — MIRABEGRON 25 MG/1
TABLET, FILM COATED, EXTENDED RELEASE ORAL
Qty: 90 TABLET | Refills: 1 | Status: SHIPPED | OUTPATIENT
Start: 2025-03-05

## 2025-03-05 RX ORDER — ATORVASTATIN CALCIUM 20 MG/1
20 TABLET, FILM COATED ORAL EVERY MORNING
Qty: 90 TABLET | Refills: 1 | Status: SHIPPED | OUTPATIENT
Start: 2025-03-05

## 2025-04-07 ENCOUNTER — OFFICE VISIT (OUTPATIENT)
Dept: ORTHOPEDIC SURGERY | Age: 55
End: 2025-04-07
Payer: COMMERCIAL

## 2025-04-07 VITALS
HEART RATE: 75 BPM | BODY MASS INDEX: 29.25 KG/M2 | TEMPERATURE: 98.1 F | RESPIRATION RATE: 18 BRPM | DIASTOLIC BLOOD PRESSURE: 88 MMHG | OXYGEN SATURATION: 94 % | SYSTOLIC BLOOD PRESSURE: 126 MMHG | HEIGHT: 66 IN | WEIGHT: 182 LBS

## 2025-04-07 DIAGNOSIS — M79.642 PAIN OF LEFT HAND: Primary | ICD-10-CM

## 2025-04-07 PROCEDURE — 3079F DIAST BP 80-89 MM HG: CPT | Performed by: FAMILY MEDICINE

## 2025-04-07 PROCEDURE — 3074F SYST BP LT 130 MM HG: CPT | Performed by: FAMILY MEDICINE

## 2025-04-07 PROCEDURE — 99203 OFFICE O/P NEW LOW 30 MIN: CPT | Performed by: FAMILY MEDICINE

## 2025-04-07 PROCEDURE — 20604 DRAIN/INJ JOINT/BURSA W/US: CPT | Performed by: FAMILY MEDICINE

## 2025-04-07 RX ORDER — TRIAMCINOLONE ACETONIDE 40 MG/ML
20 INJECTION, SUSPENSION INTRA-ARTICULAR; INTRAMUSCULAR ONCE
Status: COMPLETED | OUTPATIENT
Start: 2025-04-07 | End: 2025-04-07

## 2025-04-07 RX ORDER — LIDOCAINE HYDROCHLORIDE 10 MG/ML
1 INJECTION, SOLUTION INFILTRATION; PERINEURAL ONCE
Status: COMPLETED | OUTPATIENT
Start: 2025-04-07 | End: 2025-04-07

## 2025-04-07 RX ADMIN — LIDOCAINE HYDROCHLORIDE 1 ML: 10 INJECTION, SOLUTION INFILTRATION; PERINEURAL at 16:44

## 2025-04-07 RX ADMIN — TRIAMCINOLONE ACETONIDE 20 MG: 40 INJECTION, SUSPENSION INTRA-ARTICULAR; INTRAMUSCULAR at 16:45

## 2025-04-15 NOTE — PROGRESS NOTES
[facility]  PRIMARY CARE SPORTS MEDICINE  DATE OF VISIT : 2025     Patient : Alba Kaur  Age : 54 y.o.   : 1970  MRN : [id number]   ______________________________________________________________________    Chief Complaint :   Chief Complaint   Patient presents with    Hand Pain     Left  for a few years    mainly thumb  wrist area         HPI : Alba Kaur is 54 y.o. female who presented to the clinic today for evaluation of left hand pain. Onset of the symptoms was a few years ago, with no known mechanism of injury.  Current symptoms include pain and swelling.  Patient denies numbness and tingling.  Pain is aggravated by any repetitive use of the right hand. Evaluation to date: XRs of the left hand/wrist which demonstrate no acute fractures or dislocations.  Treatment to date: avoidance of offending activity and OTC analgesics which are not very effective.     Past Medical History :  Past Medical History:   Diagnosis Date    Colon polyp 06/10/2021    Tubular adenoma .  Dr. Phan    COVID-19 virus infection 2023    Hyperlipidemia     Hypertension     Seasonal allergies      Past Surgical History:   Procedure Laterality Date    BREAST REDUCTION SURGERY Bilateral 2020    BILATERAL BREAST REDUCTION WITH POSSIBLE FREE NIPPLE GRAFTS, POSSIBLE LIPOSUCTION BILATERAL AXILLA performed by Don Millard MD at [facility] OR    CARPAL TUNNEL RELEASE Right 06/10/2020    RIGHT CARPAL TUNNEL RELEASE ENDOSCOPIC performed by Alec Spivey MD at [facility] OR     SECTION      COLONOSCOPY N/A 6/10/2021    COLONOSCOPY WITH BIOPSY performed by Gary Phan DO at [facility] ENDOSCOPY    FINGER TRIGGER RELEASE Right 06/10/2020    RIGHT THUMB FINGER TRIGGER RELEASE performed by Alec Spivey MD at [facility] OR    TUBAL LIGATION         Allergies :   No Known Allergies    Medication List :    Current Outpatient Medications   Medication Sig Dispense Refill    atorvastatin (LIPITOR) 20 MG tablet

## 2025-06-30 ENCOUNTER — HOSPITAL ENCOUNTER (OUTPATIENT)
Dept: GENERAL RADIOLOGY | Age: 55
Discharge: HOME | End: 2025-07-02
Payer: COMMERCIAL

## 2025-06-30 VITALS — WEIGHT: 178 LBS | BODY MASS INDEX: 28.73 KG/M2

## 2025-06-30 DIAGNOSIS — Z12.31 VISIT FOR SCREENING MAMMOGRAM: ICD-10-CM

## 2025-06-30 PROCEDURE — 77063 BREAST TOMOSYNTHESIS BI: CPT

## 2025-07-29 LAB
CHOLEST SERPL-MCNC: 181 MG/DL
GLUCOSE SERPL-MCNC: 89 MG/DL (ref 74–99)
HDLC SERPL-MCNC: 51 MG/DL
LDLC SERPL CALC-MCNC: 85 MG/DL
PATIENT FASTING?: YES
TRIGL SERPL-MCNC: 225 MG/DL
VLDLC SERPL CALC-MCNC: 45 MG/DL

## 2025-08-28 ENCOUNTER — OFFICE VISIT (OUTPATIENT)
Dept: PRIMARY CARE CLINIC | Age: 55
End: 2025-08-28
Payer: COMMERCIAL

## 2025-08-28 VITALS
WEIGHT: 184.5 LBS | DIASTOLIC BLOOD PRESSURE: 84 MMHG | HEIGHT: 65 IN | BODY MASS INDEX: 30.74 KG/M2 | TEMPERATURE: 97.3 F | OXYGEN SATURATION: 98 % | SYSTOLIC BLOOD PRESSURE: 122 MMHG | HEART RATE: 97 BPM

## 2025-08-28 DIAGNOSIS — R23.2 HOT FLASHES: ICD-10-CM

## 2025-08-28 DIAGNOSIS — E78.5 DYSLIPIDEMIA: ICD-10-CM

## 2025-08-28 DIAGNOSIS — I10 ESSENTIAL HYPERTENSION: ICD-10-CM

## 2025-08-28 DIAGNOSIS — Z00.00 ANNUAL PHYSICAL EXAM: Primary | ICD-10-CM

## 2025-08-28 PROCEDURE — 3074F SYST BP LT 130 MM HG: CPT | Performed by: FAMILY MEDICINE

## 2025-08-28 PROCEDURE — 3079F DIAST BP 80-89 MM HG: CPT | Performed by: FAMILY MEDICINE

## 2025-08-28 PROCEDURE — 99396 PREV VISIT EST AGE 40-64: CPT | Performed by: FAMILY MEDICINE

## 2025-08-28 RX ORDER — AMLODIPINE BESYLATE 5 MG/1
TABLET ORAL
Qty: 90 TABLET | Refills: 1 | Status: SHIPPED | OUTPATIENT
Start: 2025-08-28

## 2025-08-28 RX ORDER — MULTIVITAMIN WITH IRON
1 TABLET ORAL DAILY
COMMUNITY

## 2025-08-28 RX ORDER — MIRABEGRON 25 MG/1
TABLET, FILM COATED, EXTENDED RELEASE ORAL
Qty: 90 TABLET | Refills: 1 | Status: SHIPPED | OUTPATIENT
Start: 2025-08-28

## 2025-08-28 RX ORDER — PROGESTERONE 100 MG/1
100 CAPSULE ORAL NIGHTLY
Qty: 30 CAPSULE | Refills: 5 | Status: SHIPPED | OUTPATIENT
Start: 2025-08-28

## 2025-08-28 RX ORDER — ATORVASTATIN CALCIUM 20 MG/1
20 TABLET, FILM COATED ORAL EVERY MORNING
Qty: 90 TABLET | Refills: 1 | Status: SHIPPED | OUTPATIENT
Start: 2025-08-28

## 2025-08-28 RX ORDER — ESTRADIOL 0.03 MG/D
1 FILM, EXTENDED RELEASE TRANSDERMAL
Qty: 8 PATCH | Refills: 5 | Status: SHIPPED | OUTPATIENT
Start: 2025-08-28

## 2025-08-28 SDOH — ECONOMIC STABILITY: FOOD INSECURITY: WITHIN THE PAST 12 MONTHS, YOU WORRIED THAT YOUR FOOD WOULD RUN OUT BEFORE YOU GOT MONEY TO BUY MORE.: NEVER TRUE

## 2025-08-28 SDOH — ECONOMIC STABILITY: FOOD INSECURITY: WITHIN THE PAST 12 MONTHS, THE FOOD YOU BOUGHT JUST DIDN'T LAST AND YOU DIDN'T HAVE MONEY TO GET MORE.: NEVER TRUE

## 2025-08-28 ASSESSMENT — PATIENT HEALTH QUESTIONNAIRE - PHQ9
SUM OF ALL RESPONSES TO PHQ QUESTIONS 1-9: 0
2. FEELING DOWN, DEPRESSED OR HOPELESS: NOT AT ALL
1. LITTLE INTEREST OR PLEASURE IN DOING THINGS: NOT AT ALL
SUM OF ALL RESPONSES TO PHQ QUESTIONS 1-9: 0

## (undated) DEVICE — COVER HNDL LT DISP

## (undated) DEVICE — PACK,UNIV, II AURORA: Brand: MEDLINE

## (undated) DEVICE — DRAPE THER FLUID WARMING 66X44 IN FLAT SLUSH DBL DISC ORS

## (undated) DEVICE — PAD,ABDOMINAL,5"X9",ST,LF,25/BX: Brand: MEDLINE INDUSTRIES, INC.

## (undated) DEVICE — PADDING,UNDERCAST,COTTON, 4"X4YD STERILE: Brand: MEDLINE

## (undated) DEVICE — SURGICAL PROCEDURE PACK HND

## (undated) DEVICE — SYRINGE MED 10ML LUERLOCK TIP W/O SFTY DISP

## (undated) DEVICE — NEEDLE HYPO 25GA L0.625IN BLU POLYPR HUB S STL REG BVL STR

## (undated) DEVICE — Device

## (undated) DEVICE — TUBING SUCTION 15FT 3MM 32CM SILICONE

## (undated) DEVICE — E-Z CLEAN, NON-STICK, PTFE COATED, ELECTROSURGICAL BLADE ELECTRODE, MODIFIED EXTENDED INSULATION, 2.5 INCH (6.35 CM): Brand: MEGADYNE

## (undated) DEVICE — SOLUTION IV IRRIG POUR BRL 0.9% SODIUM CHL 2F7124

## (undated) DEVICE — BRA SURG 2XL FULL SUPP SFT CUP FR CLSR ADJ STRP

## (undated) DEVICE — DRESSING,GAUZE,XEROFORM,CURAD,5"X9",ST: Brand: CURAD

## (undated) DEVICE — SPONGE LAP W18XL18IN WHT COT 4 PLY FLD STRUNG RADPQ DISP ST

## (undated) DEVICE — GLOVE SURG SZ 7 L12IN FNGR THK94MIL TRNSLUC YEL LTX HYDRGEL

## (undated) DEVICE — TOWEL,OR,DSP,ST,BLUE,DLX,10/PK,8PK/CS: Brand: MEDLINE

## (undated) DEVICE — INTENDED FOR TISSUE SEPARATION, AND OTHER PROCEDURES THAT REQUIRE A SHARP SURGICAL BLADE TO PUNCTURE OR CUT.: Brand: BARD-PARKER ® STAINLESS STEEL BLADES

## (undated) DEVICE — DOUBLE BASIN SET: Brand: MEDLINE INDUSTRIES, INC.

## (undated) DEVICE — SAFETY PINS: Brand: CENTURION

## (undated) DEVICE — GAUZE 2X2IN ST BORDERED

## (undated) DEVICE — PADDING CAST W2INXL4YD COT LO LINTING WYTEX

## (undated) DEVICE — Z CONVERTED USE 2271116 TUBING SUCT L9FT STD LIPO DISP FOR BAXTER CANSTR SYS

## (undated) DEVICE — BNDG,ELSTC,MATRIX,STRL,2"X5YD,LF,HOOK&LP: Brand: MEDLINE

## (undated) DEVICE — 4-PORT MANIFOLD: Brand: NEPTUNE 2

## (undated) DEVICE — GRADUATE TRIANG MEASURE 1000ML BLK PRNT

## (undated) DEVICE — FORCEPS BX L240CM JAW DIA2.4MM ORNG L CAP W/ NDL DISP RAD

## (undated) DEVICE — GAUZE,SPONGE,4"X4",8PLY,STRL,LF,10/TRAY: Brand: MEDLINE

## (undated) DEVICE — STANDARD (U) BLADE ASSEMBLY 6PK: Brand: MICROAIRE®

## (undated) DEVICE — TIP SGL SPRY BIOMATERIALS OPN AND ENDO CONCL SPRY OUTPT

## (undated) DEVICE — SYRINGE IRRIG 60ML SFT PLIABLE BLB EZ TO GRP 1 HND USE W/

## (undated) DEVICE — TOWEL,OR,DSP,ST,BLUE,STD,6/PK,12PK/CS: Brand: MEDLINE

## (undated) DEVICE — PADDING,UNDERCAST,COTTON, 3X4YD STERILE: Brand: MEDLINE

## (undated) DEVICE — ZIMMER® STERILE DISPOSABLE TOURNIQUET CUFF WITH PLC, DUAL PORT, SINGLE BLADDER, 18 IN. (46 CM)

## (undated) DEVICE — APPLICATOR MEDICATED 26 CC SOLUTION HI LT ORNG CHLORAPREP

## (undated) DEVICE — DRAIN SURG 15FR L3/16IN DIA4.7MM SIL CHN RND HUBLESS FULL

## (undated) DEVICE — SURGICAL PROCEDURE PACK BASIC

## (undated) DEVICE — PATIENT RETURN ELECTRODE, SINGLE-USE, CONTACT QUALITY MONITORING, ADULT, WITH 9FT CORD, FOR PATIENTS WEIGING OVER 33LBS. (15KG): Brand: MEGADYNE

## (undated) DEVICE — SPONGE GZ W4XL4IN RAYON POLY FILL CVR W/ NONWOVEN FAB